# Patient Record
Sex: MALE | Race: WHITE
[De-identification: names, ages, dates, MRNs, and addresses within clinical notes are randomized per-mention and may not be internally consistent; named-entity substitution may affect disease eponyms.]

---

## 2019-07-17 ENCOUNTER — HOSPITAL ENCOUNTER (INPATIENT)
Dept: HOSPITAL 25 - ED | Age: 59
LOS: 5 days | Discharge: HOME HEALTH | DRG: 467 | End: 2019-07-22
Attending: ORTHOPAEDIC SURGERY | Admitting: ORTHOPAEDIC SURGERY
Payer: COMMERCIAL

## 2019-07-17 DIAGNOSIS — R78.81: ICD-10-CM

## 2019-07-17 DIAGNOSIS — T84.51XA: Primary | ICD-10-CM
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
## 2019-07-17 DIAGNOSIS — E78.5: ICD-10-CM

## 2019-07-17 DIAGNOSIS — F32.9: ICD-10-CM
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
## 2019-07-17 DIAGNOSIS — Z87.891: ICD-10-CM

## 2019-07-17 DIAGNOSIS — B95.4: ICD-10-CM

## 2019-07-17 DIAGNOSIS — D62: ICD-10-CM

## 2019-07-17 DIAGNOSIS — I10: ICD-10-CM

## 2019-07-17 LAB
ALBUMIN SERPL BCG-MCNC: 4.1 G/DL (ref 3.2–5.2)
ALBUMIN/GLOB SERPL: 1.6 {RATIO} (ref 1–3)
ALP SERPL-CCNC: 62 U/L (ref 34–104)
ALT SERPL W P-5'-P-CCNC: 23 U/L (ref 7–52)
ANION GAP SERPL CALC-SCNC: 8 MMOL/L (ref 2–11)
AST SERPL-CCNC: 15 U/L (ref 13–39)
BASOPHILS # BLD AUTO: 0.1 10^3/UL (ref 0–0.2)
BUN SERPL-MCNC: 12 MG/DL (ref 6–24)
BUN/CREAT SERPL: 12.5 (ref 8–20)
CALCIUM SERPL-MCNC: 9 MG/DL (ref 8.6–10.3)
CHLORIDE SERPL-SCNC: 104 MMOL/L (ref 101–111)
EOSINOPHIL # BLD AUTO: 0 10^3/UL (ref 0–0.6)
GLOBULIN SER CALC-MCNC: 2.6 G/DL (ref 2–4)
GLUCOSE SERPL-MCNC: 134 MG/DL (ref 70–100)
HCO3 SERPL-SCNC: 24 MMOL/L (ref 22–32)
HCT VFR BLD AUTO: 45 % (ref 42–52)
HGB BLD-MCNC: 15.7 G/DL (ref 14–18)
LYMPHOCYTES # BLD AUTO: 1.2 10^3/UL (ref 1–4.8)
MCH RBC QN AUTO: 31 PG (ref 27–31)
MCHC RBC AUTO-ENTMCNC: 35 G/DL (ref 31–36)
MCV RBC AUTO: 88 FL (ref 80–94)
MONOCYTES # BLD AUTO: 2 10^3/UL (ref 0–0.8)
NEUTROPHILS # BLD AUTO: 15.5 10^3/UL (ref 1.5–7.7)
NRBC # BLD AUTO: 0 10^3/UL
NRBC BLD QL AUTO: 0.1
PLATELET # BLD AUTO: 207 10^3/UL (ref 150–450)
POTASSIUM SERPL-SCNC: 3.7 MMOL/L (ref 3.5–5)
PROT SERPL-MCNC: 6.7 G/DL (ref 6.4–8.9)
RBC # BLD AUTO: 5.14 10^6 /UL (ref 4.18–5.48)
SODIUM SERPL-SCNC: 136 MMOL/L (ref 135–145)
WBC # BLD AUTO: 18.8 10^3/UL (ref 3.5–10.8)

## 2019-07-17 PROCEDURE — 87205 SMEAR GRAM STAIN: CPT

## 2019-07-17 PROCEDURE — 80202 ASSAY OF VANCOMYCIN: CPT

## 2019-07-17 PROCEDURE — 87186 SC STD MICRODIL/AGAR DIL: CPT

## 2019-07-17 PROCEDURE — 80053 COMPREHEN METABOLIC PANEL: CPT

## 2019-07-17 PROCEDURE — 86900 BLOOD TYPING SEROLOGIC ABO: CPT

## 2019-07-17 PROCEDURE — 89051 BODY FLUID CELL COUNT: CPT

## 2019-07-17 PROCEDURE — 86140 C-REACTIVE PROTEIN: CPT

## 2019-07-17 PROCEDURE — 87476 LYME DIS DNA AMP PROBE: CPT

## 2019-07-17 PROCEDURE — 93306 TTE W/DOPPLER COMPLETE: CPT

## 2019-07-17 PROCEDURE — 77002 NEEDLE LOCALIZATION BY XRAY: CPT

## 2019-07-17 PROCEDURE — 36415 COLL VENOUS BLD VENIPUNCTURE: CPT

## 2019-07-17 PROCEDURE — C1776 JOINT DEVICE (IMPLANTABLE): HCPCS

## 2019-07-17 PROCEDURE — 87040 BLOOD CULTURE FOR BACTERIA: CPT

## 2019-07-17 PROCEDURE — 77001 FLUOROGUIDE FOR VEIN DEVICE: CPT

## 2019-07-17 PROCEDURE — 88300 SURGICAL PATH GROSS: CPT

## 2019-07-17 PROCEDURE — 85025 COMPLETE CBC W/AUTO DIFF WBC: CPT

## 2019-07-17 PROCEDURE — 80048 BASIC METABOLIC PNL TOTAL CA: CPT

## 2019-07-17 PROCEDURE — 20610 DRAIN/INJ JOINT/BURSA W/O US: CPT

## 2019-07-17 PROCEDURE — 86850 RBC ANTIBODY SCREEN: CPT

## 2019-07-17 PROCEDURE — 87641 MR-STAPH DNA AMP PROBE: CPT

## 2019-07-17 PROCEDURE — 86618 LYME DISEASE ANTIBODY: CPT

## 2019-07-17 PROCEDURE — 87640 STAPH A DNA AMP PROBE: CPT

## 2019-07-17 PROCEDURE — 87070 CULTURE OTHR SPECIMN AEROBIC: CPT

## 2019-07-17 PROCEDURE — 87073 CULTURE BACTERIA ANAEROBIC: CPT

## 2019-07-17 PROCEDURE — 85652 RBC SED RATE AUTOMATED: CPT

## 2019-07-17 PROCEDURE — 87798 DETECT AGENT NOS DNA AMP: CPT

## 2019-07-17 PROCEDURE — 85018 HEMOGLOBIN: CPT

## 2019-07-17 PROCEDURE — 36569 INSJ PICC 5 YR+ W/O IMAGING: CPT

## 2019-07-17 PROCEDURE — 85049 AUTOMATED PLATELET COUNT: CPT

## 2019-07-17 PROCEDURE — 85610 PROTHROMBIN TIME: CPT

## 2019-07-17 PROCEDURE — 99284 EMERGENCY DEPT VISIT MOD MDM: CPT

## 2019-07-17 PROCEDURE — 76937 US GUIDE VASCULAR ACCESS: CPT

## 2019-07-17 PROCEDURE — 85014 HEMATOCRIT: CPT

## 2019-07-17 PROCEDURE — 88112 CYTOPATH CELL ENHANCE TECH: CPT

## 2019-07-17 PROCEDURE — 87077 CULTURE AEROBIC IDENTIFY: CPT

## 2019-07-17 PROCEDURE — 86901 BLOOD TYPING SEROLOGIC RH(D): CPT

## 2019-07-17 RX ADMIN — OXYCODONE HYDROCHLORIDE AND ACETAMINOPHEN PRN TAB: 5; 325 TABLET ORAL at 20:53

## 2019-07-17 RX ADMIN — DOCUSATE SODIUM SCH MG: 100 CAPSULE, LIQUID FILLED ORAL at 20:54

## 2019-07-17 RX ADMIN — MAGNESIUM HYDROXIDE SCH: 400 SUSPENSION ORAL at 20:54

## 2019-07-17 RX ADMIN — OXYCODONE HYDROCHLORIDE AND ACETAMINOPHEN PRN TAB: 5; 325 TABLET ORAL at 16:41

## 2019-07-17 NOTE — CONSULT
Subjective


Date of Service: 19


Interval History: 





This is a 58 year old male with hx of OA, HTN and HLP that presented to the ED 

with 2 day history of progressive hip pain, fevers, chills and diaphoresis. 

Patient has history of right total hip replacement in  with Dr. Nguyễn. He 

has had no complications since his surgery and up to this point has been in 

good health. In the ER, he is noted to have leukocytosis and fever. Joint 

aspirate of the right hip appears purulent. Plan is for the OR tomorrow with 

orthopedics for washout. Hospitalists are consulted for pre-op evaluation.





Patient denies any dyspnea with exertion, no chest pain, no previous hx of 

ischemic events or stroke, no renal disease and no hx of diabetes mellitus. He 

normally walks 6-8 miles per day at work without issue.





 


Family History: Unchanged from Admission - non-contributory


Social History: Unchanged from Admission - remote history of tobacco, denies 

ETOH, no drugs, works full time, lives with wife


Past Medical History: Unchanged from Admission - HTN, HLP, OA





Review of Systems





- Measurements


Intake and Output: 


Intake and Output Last 24 Hours











 07/15/19 07/16/19 07/17/19 07/18/19





 06:59 06:59 06:59 06:59


 


Intake Total    520


 


Balance    520


 


Weight    245 lb


 


Intake:    


 


  IV Fluids    20


 


    NS    20


 


  Medicated IV    500


 


    Vancomycin    500














- Review of Systems


General Comments: 





Reports general malaise, pain and discomfort


Constitutional Symptoms: 


   Negative: Weight Gain, Weight Loss, Weakness, Fatigue, Fever, Night Sweats, 

Unexplained Falls, Other


Dermatology: 


   Negative: Normal, Rash, Skin Lesions, Cancer, Skin Lumps, Other


HEENT: Positive: Normal


Eyes: Positive: Contacts or Glasses


Thyroid: Positive: Normal


Pulmonary: 


   Negative: Normal, Cough, Sputum, Hemoptysis, Wheezing, Respiratory Distress, 

Shortness of Breath, COPD, Asthma, Exercise Intolerance, Home Oxygen, Other


Cardiology: 


   Negative: Normal, Chest Pain, Shortness of Breath, Palpitations, Swelling of 

Ankles, Peripheral Vascular Dis, Edema, Faintness, Syncope, Claudication, 

Proximal NocturnalDyspnea, Orthopnoea, Other


Gastroenterology: 


   Negative: Normal, Abdominal Pain, Nausea, Vomiting, Anorexia, Indigestion, 

Difficulty Swallowing, Heartburn, Constipation, Diarrhea, Blood in Stools, 

Change in Bowel Habits, Haematemesis, Melena, Other


Genital - Urinary: 


   Negative: Normal, Dysuria, Hematuria, Polyuria, Nocturia, Other


Musculoskeletal: Positive: Joint Pain


Endocrinology: 


   Negative: Normal, Thyroid Problems, Adrenal Problems, Gonadal Problems, 

Family Hx Endocrine Disorders, Obesity, Diabetes Mellitus, Hyperglycemia, Hx 

Hypoglycemia, Diabetic Foot Ulcers, Calluses, Hirsutism, Menstrual Abnormalities

, Polydipsia, Polyuria, Gonadal Problems, Gynecomastia, Pituitary disease, Other


Neurology: Positive: Normal


Psychiatry: Positive: Normal


Allergic/Immunologic: 


   Negative: Hx Anaphylaxis, Hx Angioedema, Hx Environmental, Hx Seasonal, 

Asthma, Hx HIV, Immunocompromise, Swollen Glands LymphNodes, Other





Objective


Active Medications: 








Acetaminophen (Tylenol Tab*)  975 mg PO Q8H PRN


   PRN Reason: FEVER/PAIN


Atenolol (Tenormin Tab*)  25 mg PO DAILY [facility]


Cyclobenzaprine HCl (Flexeril Tab*)  10 mg PO TID PRN


   PRN Reason: SPASMS


Diphenhydramine HCl (Benadryl Iv*)  25 mg IV Q6H PRN


   PRN Reason: itching


Diphenhydramine HCl (Benadryl Po*)  25 mg PO Q6H PRN


   PRN Reason: itching


Docusate Sodium (Colace Cap*)  100 mg PO BID [facility]


Heparin Sodium (Porcine) (Heparin Vial(*))  5,000 units SUBCUT Q8HR [facility]


   Stop: 19 23:59


Vancomycin HCl 1,250 mg/ (Sodium Chloride)  250 mls @ 166.667 mls/hr IVPB Q8H 

[facility]


Lactated Ringer's (Lactated Ringers 1000 Ml Bag*)  1,000 mls @ 100 mls/hr IV 

PER RATE [facility]


Lactulose (Lactulose*)  30 ml PO BID PRN


   PRN Reason: CONSTIPATION


Magnesium Hydroxide (Milk Of Magnesia Liq*)  30 ml PO BID [facility]


Magnesium Hydroxide (Milk Of Magnesia Liq*)  30 ml PO Q6H PRN


   PRN Reason: constipation


Morphine Sulfate (Morphine Inj (Syringe))*)  2 mg IV Q4H PRN


   PRN Reason: PAIN - BREAKTHROUGH


Ondansetron HCl (Zofran Inj*)  4 mg IV Q6H PRN


   PRN Reason: nausea


Ondansetron HCl (Zofran Odt Tab*)  4 mg PO Q6H PRN


   PRN Reason: NAUSEA


Oxycodone HCl (Roxycodone Tab*)  10 mg PO Q4H PRN


   PRN Reason: PAIN - SEVERE


Oxycodone/Acetaminophen (Percocet 5/325 Tab*)  1 tab PO Q4H PRN


   PRN Reason: PAIN - MILD


Oxycodone/Acetaminophen (Percocet 5/325 Tab*)  2 tab PO Q4H PRN


   PRN Reason: PAIN - MODERATE


   Last Admin: 19 16:41 Dose:  2 tab


Paroxetine HCl (Paxil Tab*)  10 mg PO DAILY [facility]


Pharmacy Consult (Vancomycin Per Pharmacy*)  1 note FOLLOW UP .VANC PER 

PHARMACY AIME; Protocol


Pharmacy Profile Note (Vancomycin Trough Check)  1 note FOLLOW UP 1500 ONE


   Stop: 19 15:01








 Vital Signs - 8 hr











  19





  11:29 11:59 12:01


 


Temperature   


 


Pulse Rate 79 75 75


 


Respiratory   





Rate   


 


Blood Pressure 113/67 122/65 





(mmHg)   


 


O2 Sat by Pulse 93 94 94





Oximetry   














  19





  13:00 13:05 14:42


 


Temperature   


 


Pulse Rate 77 81 77


 


Respiratory   





Rate   


 


Blood Pressure  120/74 





(mmHg)   


 


O2 Sat by Pulse 94 94 94





Oximetry   














  19





  14:44 14:53 15:00


 


Temperature  100.0 F 


 


Pulse Rate 82 75 77


 


Respiratory  16 





Rate   


 


Blood Pressure 138/77 138/77 





(mmHg)   


 


O2 Sat by Pulse 95 96 95





Oximetry   














  19





  15:05 16:00 16:05


 


Temperature   


 


Pulse Rate 84 84 83


 


Respiratory   





Rate   


 


Blood Pressure 126/68  120/67





(mmHg)   


 


O2 Sat by Pulse 93 92 92





Oximetry   














  19





  16:26 16:40 16:41


 


Temperature 99.4 F  


 


Pulse Rate 80  


 


Respiratory 18 20 18





Rate   


 


Blood Pressure 123/62  





(mmHg)   


 


O2 Sat by Pulse 95  





Oximetry   














  19





  17:14


 


Temperature 99.4 F


 


Pulse Rate 80


 


Respiratory 18





Rate 


 


Blood Pressure 123/62





(mmHg) 


 


O2 Sat by Pulse 95





Oximetry 











Oxygen Devices in Use Now: None


Appearance: alert, mildly diaphoretic, NAD


Eyes: No Scleral Icterus, PERRLA


Ears/Nose/Mouth/Throat: NL Teeth, Lips, Gums, Clear Oropharnyx, Mucous 

Membranes Moist


Neck: NL Appearance and Movements; NL JVP, Trachea Midline


Respiratory: Symmetrical Chest Expansion and Respiratory Effort, Clear to 

Auscultation


Cardiovascular: NL Sounds; No Murmurs; No JVD, RRR, No Edema


Abdominal: NL Sounds; No Tenderness; No Distention, No Hepatosplenomegaly


Lymphatic: No Cervical Adenopathy


Extremities: No Edema, No Clubbing, Cyanosis


Skin: No Rash or Ulcers, - - mild erythema over right hip


Neurological: Alert and Oriented x 3, NL Sensation, NL Muscle Strength and Tone


Nutrition: Taking PO's


Result Diagrams: 


 19 10:05





 19 10:05


Microbiology and Other Data: 


 Microbiology











 19 14:14 Gram Stain - Final





 Body Fluid Skin and Soft Tissue MRSA/MSSA (PCR - Final





    Mrsa Negative





    S.aureus Negative











Diagnostic Imaging: 





Patient Name:         ANGELITO DE LA O                                           

                        Medical Record#: [id number]


Ordering Physician: Leo Alonzo MD                                          

                        Acct.#: [id number]


:     1960         Age: 58   Sex: M                                   

                        Location: EMERGENCY DEPARTMENT


Exam Date: 19 1209                                                       

                        ADM Status: REG ER


Order Information:                         Aspiration/Injection Hip Right


Accession Number:                          [id number]


CPT:                                       [id number]


Indication: Right hip pain, fever and elevated white blood cell.





Approximately 27 seconds of fluoroscopy time was used.





Using usual aseptic technique and lidocaine as local anesthetic a 22-gauge 

needle was


placed within the right hip joint. Aspiration was attempted. 2 mL of saline was 

then


injected into the joint space. The needle was then manipulated. Approximately 

15 mL of


pink murky fluid was then aspirated. The specimen was sent for bacteriology.





IMPRESSION: Successful aspiration and localization of the right hip with 

aspiration of 15


mL of murky pink fluid from the right hip joint.





Patient Name:         ANGELITO DE LA O                                           

                        Medical Record#: [id number]


Ordering Physician: Elisa HEATH                                        

                        Acct.#: [id number]


:     1960         Age: 58   Sex: M                                   

                        Location: EMERGENCY DEPARTMENT


Exam Date: 19 0808                                                       

                        ADM Status: REG ER


Order Information:                         HIP RIGHT 2 VIEWS AND PELVIS


Accession Number:                          [id number]


CPT:                                       [id number]


Indication: RIGHT hip pain. Post hip replacement in .





Comparison: 2012





Technique: AP pelvis and AP and frog-leg lateral views RIGHT hip.





Report: Normally located RIGHT total hip replacement. Negative for 

periprosthetic fracture


or stigmata of loosening. Negative for pelvic fracture or joint diastases. 

Lumbar sacral


spine degenerative spondylosis and facet joint osteoarthritis. Unremarkable 

soft tissue


contours. 





IMPRESSION: 


#. Unremarkable radiographic follow-up of prosthetic RIGHT hip.




















Assessment/Plan - Billing


Assessment and Recommendations: 





This is a 58 year old male that presents to the ER with septic right hip 

prosthesis:





1.  Septic Right Hip Prosthesis


 - POC as per orthopedics


 - OR for washout in AM


 - NPO after midnight


 - Pain control PRN


 - ID consulted, continue vancomycin and follow cultures


 - Tylenol for fevers


 - Does not appear to be meeting sepsis criteria, has leukocytosis but no 

hypotension or tachycardia





2.  Hx of HTN


 - Continue atenolol





3. HLP


 - Continue simvastatin





VTE PPX: 


 - Heparin subq








Diet:


 - NPO after midnight








Code Status:


 - Full code








Admission Status and Rationale:


 


 - RCRI Score is 0 Points, Class I Risk or 3.9% 30 day Risk for Post Operative 

Cardiac Event


 - Medically optimized for procedure in AM


 - Inpatient, dispo per ortho.

## 2019-07-17 NOTE — CONSULT
Consult


Consult: 





S:





See consult note by IVANA Yao from Ortho Service.





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 Nguyễn, without 

any problems whatsoever with R hip until yesterday, 7/16/19.





R hip pain started at work yesterday, worsened so that he could barely walk.  

Overnight fevers, sweats, chills, came to ED this morning.  ED history and exam 

concerning for infection, labs obtained, ortho called and patient admitted to 

my service.





No recent risk factors identified for a bacterial joint infection.  Colonoscopy 

multiple months ago.  Recent embedded tick bite 2-3 weeks ago on face.





O:





Diaphoretic.


RLE:


-  increased warmth skin of hip and thigh


-  TTP about hip


-  pain beyond passive hip flexion of 40 degrees, pain with passive hip rotation


-  NVID  


Microbiology





07/17/19 14:14   Body Fluid   Gram Stain - Final





 Selected Entries











  07/17/19 07/17/19





  16:26 17:14


 


Temperature 99.4 F 99.4 F








 Laboratory Tests











  07/17/19 07/17/19 07/17/19





  10:05 10:05 14:14


 


WBC  18.8 H  


 


Neut % (Auto)  82.7  


 


C-Reactive Protein   150.34 H 


 


Fluid WBC    00967











A:





Infected R total hip arthroplasty








P:





-  See note from IVANA Yao 


-  Given the level of pain, the significant elevation in WBC, the gram stain, a 

bacterial infection is significantly more likely than a Lyme infection


-  The patient benefits prognosis-wise from an early onset with just 1 day of 

symptoms at this point


-  ID and Hospitalist consults pending


-  The patient will go to the OR tomorrow or Friday for open I&D with exchange 

of polyethylene liner by myself or one of my partners


-  Await blood culture, hip aspiration culture results to refine antibiotic 

coverage.  Patient currently on Vanco.


-  NPO p midnight, hold anticoagulation post midnight

## 2019-07-17 NOTE — ED
Lower Extremity





- HPI Summary


HPI Summary: 


This patient is a 58 year [facility] with a 

chief complaint of progressively worsening right hip pain since 7/16/19. Pt 

reports he cannot ambulate, and has no thigh or lateral strength. Pt previously 

has had a hip replacement, but has not had complications before this. Per triage

, the patient rates the pain 10/10 in severity. 








- History of Current Complaint


Chief Complaint: EDHipPelvisInjury


Stated Complaint: THINKS HIP REPLACEMENT BROKE PER PT


Time Seen by Provider: 07/17/19 08:59


Hx Obtained From: Patient


Onset of Pain: Hours


Onset/Duration: Still Present


Severity Initially: Moderate


Severity Currently: Severe


Pain Intensity: 10


Pain Scale Used: 0-10 Numeric


Timing: Constant


Location: Is Diffuse - right hip


Aggravating Factor(s): Standing, Ambulation, Movement


Able to Bear Weight: No





- Allergies/Home Medications


Allergies/Adverse Reactions: 


 Allergies











Allergy/AdvReac Type Severity Reaction Status Date / Time


 


No Known Allergies Allergy   Verified 07/17/19 07:59











Home Medications: 


 Home Medications





Atenolol TAB* [Tenormin TAB* 25 MG] 25 mg PO DAILY 07/17/19 [History Confirmed 

07/17/19]


PARoxetine HCL TAB* [Paxil TAB*] 10 mg PO DAILY 07/17/19 [History Confirmed 07/ 17/19]











PMH/Surg Hx/FS Hx/Imm Hx


Sensory History: 


   Denies: Hx Legally Blind


EENT History: 


   Denies: Hx Deafness





- Surgical History


Surgical History: Yes


Surgery Procedure, Year, and Place: Right Hip Replacement


Infectious Disease History: No


Infectious Disease History: 


   Denies: Traveled Outside the [place] in Last 30 Days





- Family History


Known Family History: 


   Negative: Hypertension, Diabetes





- Social History


Occupation: Retired


Lives: With Family


Alcohol Use: None


Hx Substance Use: No


Substance Use Type: Reports: None


Hx Tobacco Use: No


Smoking Status (MU): Never Smoked Tobacco





Review of Systems


Negative: Fever


Positive: Other - right hip pain


All Other Systems Reviewed And Are Negative: Yes





Physical Exam





- Summary


Physical Exam Summary: 


Appearance: Well-appearing, Well-nourished, lying in bed comfortably


Skin: Warm, dry, no obvious rash


Eyes: sclera anicteric, no conjunctival pallor


ENT: mucous membranes moist, pharynx appears normal


Neck: Supple, nontender


Respiratory: Clear to auscultation, no signs of respiratory distress


Cardiovascular: Normal S1, S2. No murmurs. Normal distal pulses in tibial and 

radial bilaterally.


Abdomen: Soft, nontender, normal active bowel sounds present


Musculoskeletal: Normal, Strength/ROM Intact, Tenderness over greater trochanter


Neurological: A&Ox3, awake and alert, mentation is normal, speech is fluent and 

appropriate


Psychiatric: affect is normal, does not appear anxious or depressed








Triage Information Reviewed: Yes


Vital Signs On Initial Exam: 


 Initial Vitals











Temp Pulse Resp BP Pulse Ox


 


 98.6 F   86   16   114/70   96 


 


 07/17/19 07:57  07/17/19 07:57  07/17/19 07:57  07/17/19 07:57  07/17/19 07:57











Vital Signs Reviewed: Yes





Diagnostics





- Vital Signs


 Vital Signs











  Temp Pulse Resp BP Pulse Ox


 


 07/17/19 07:57  98.6 F  86  16  114/70  96














- Laboratory


Result Diagrams: 


 07/17/19 10:05





 07/17/19 10:05


Lab Statement: Any lab studies that have been ordered have been reviewed, and 

results considered in the medical decision making process.





- Radiology


  ** Hip/Pelvis X-ray


Radiology Interpretation Completed By: Radiologist


Summary of Radiographic Findings: Hip/Pelvis X-Ray reveals, per radiologist, 

IMPRESSION:  #. Unremarkable radiographic follow-up of prosthetic RIGHT hip.  

ED physician has reviewed this radiology report.





- Additional Comments


Diagnostic Additional Comments: 


Soft Tissue US reveals, per radiologist,


 IMPRESSION: No definite fluid collection is noted in the ultrasound of the 

right hip.


Aspiration will be attempted under fluoroscopy.  ED physician has reviewed this 

radiology report











Re-Evaluation





- Re-Evaluation


  ** First Eval


Re-Evaluation Time: 09:40


Comment: Discussed results with pt.





  ** Second Eval


Re-Evaluation Time: 11:01


Comment: Discussed plan of care with pt.





Lower Extremity Course/Dx





- Course


Course Of Treatment: This patient is a 58 year old M presenting to [facility] 

accompanied by wife with a chief complaint of progressively worsening right hip 

pain since 7/16/19. Pt reports he cannot ambulate, and has no thigh or lateral 

strength. Pt previously has had a hip replacement, but has not had 

complications before this. Per triage, the patient rates the pain 10/10 in 

severity.  Blood work obtained. WBC is 18.8, Glucose is 134, C-Reactive Protein 

is 150.34.  Hip/Pelvis X-Ray reveals, per radiologist, IMPRESSION:  #. 

Unremarkable radiographic follow-up of prosthetic RIGHT hip.  Soft Tissue US 

reveals, per radiologist,.  IMPRESSION: No definite fluid collection is noted 

in the ultrasound of the right hip.  Aspiration will be attempted under 

fluoroscopy.  We discussed patient care with Dr. Mckeon and they recommended 

pt be admitted. Dr. Wylie accepts pt for admission. Patient will be admitted. 

The patient is agreeable with this plan.





- Diagnoses


Provider Diagnoses: 


 Septic hip








- Physician Notifications


Discussed Care Of Patient With: Gary Mckeon


Time Discussed With Above Provider: 09:39


Instructed by Provider To: Other - Disccused pt case with Dr. Mckeon; 1214 - 

discussed pt case with Dr. Wylie and she acceps pt for admission.





Discharge





- Sign-Out/Discharge


Documenting (check all that apply): Patient Departure - Admit


All imaging exams completed and their final reports reviewed: Yes


Patient Received Moderate/Deep Sedation with Procedure: No





- Discharge Plan


Condition: Good


Disposition: ADMITTED TO [facility]





- Attestation Statements


Document Initiated by Scribe: Yes


Documenting Scribe: Lala Valverde


Provider For Whom Scribe is Documenting (Include Credential): Dr. Leo Alonzo MD


Scribe Attestation: 


ILala, scribed for Dr. Leo Alonzo MD on 07/17/19 at 1756. 


Status of Scribe Document: Ready

## 2019-07-17 NOTE — HP
ADMISSION HISTORY AND PHYSICAL:

 

DATE OF ADMISSION:  07/17/19

 

ATTENDING ORTHOPEDIC PROVIDER:  Gary Mckeon MD.* (DICTATED BY IVANA ELAINE)

 

CHIEF COMPLAINT:  Right hip pain.

 

HISTORY OF PRESENT ILLNESS:  The patient is a 58-year-old male who presented to 
[facility] today on 07/17/19 with a chief complaint of right hip 
pain x2 days with associated fever and chills.  Onset of pain was not 
associated with any trauma or overuse.  The patient does not have any recent 
history of illness or any recent history of dental work.  The patient is 
typically able to walk long distances without an assisted device and without 
any hip pain, though as of 2 days ago, he is completely unable to bear weight.  
He does have a history of right total hip replacement by Dr. Nguyễn in 2012.  He 
has had no problems with this hip since it was placed.  Past medical history 
includes hypertension and hyperlipidemia.    Pain is localized to the right hip
, it is severe  and It does not radiate.  The patient denies any recent illness 
including URI, chest congestion, cough, abdominal pain, nausea, dysuria, or 
skin lesions.  The patient had an embedded tick in the left side of his face 1 
week ago and has had roughly 50 tick bites in the past. He has no history of 
heart attack, stroke, DVT, PE, blood transfusion, HIV, or hepatitis.  He has no 
history of adverse effects with anesthesia.

 

PAST MEDICAL HISTORY:

1.  Hypertension.

2.  Hyperlipidemia.

 

PAST SURGICAL HISTORY:  Hernia repair and right total hip arthroplasty.  No 
history of adverse effects with anesthesia.

 

MEDICATIONS:  Home medications include simvastatin 40 mg and a blood pressure 
medication, the patient does not recall.

 

ALLERGIES:  No known drug allergies.

 

FAMILY HISTORY:  No adverse effects with anesthesia.

 

SOCIAL HISTORY:  He does not drink, smoke, or use illicit drugs.  Works at a 
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

 

REVIEW OF SYSTEMS:  General:  Positive for fever and chills.  Negative for 
recent illness.  HEENT:  Negative for any headache, head trauma.  Cardio:  No 
irregular heart beats, chest pain, or history of MI.  Respiratory:  No 
shortness of breath or cough.  No history of asthma.  Abdomen:  No abdominal 
pain, nausea, vomiting, or diarrhea.  :  No dysuria.  Musculoskeletal:  
Positive for severe right hip pain. Neuro:  Sensation intact to all extremities 
without any reported numbness or paresthesias.  Hematology:  No history of 
blood clot.  Skin:  No rash or skin lesions.

 

                               PHYSICAL EXAMINATION

 

GENERAL:  No acute distress though appears uncomfortable. +oriented to person, 
place, and time.  The patient appears flushed.

 

VITAL SIGNS:  Temperature 100.0, pulse rate 75, respiratory rate 16, oxygen 
saturation 96%, blood pressure 138/77.

 

HEENT:  Normocephalic, atraumatic.  Extraocular movements are intact.

 

RESPIRATORY:  Clear to auscultation bilaterally.

 

CARDIO:  S1, S2.

 

ABDOMEN:  Bowel sounds are normoactive.  Nontender to palpation.  No guarding 
or rigidity.

 

MUSCULOSKELETAL:  Upper extremities and left lower extremity, skin envelope 
intact. No obvious deformity.  Nontender to palpation.  Active flexion and 
extension in all major joints without pain.  Right lower extremity, skin 
envelope intact.  No obvious bony deformity.  The patient is exquisitely tender 
over the hip.  There is no erythema, warmth, or obvious fluctuance in this 
area.  He is unable to actively flex at the hip due to pain.  Passive range of 
motion 0 to 45 is relatively nonpainful, though he does have some moderate pain 
starting at 45 degrees of flexion that becomes severe by 80 degrees of flexion.
  Negative logroll at the hip.

 

NEURO:  Sensation intact to light touch throughout bilateral upper and lower 
extremities.

 

VASCULAR:  DP pulse 2+ bilaterally.

 

PSYCH:  Appropriate affect.

 

SKIN:  No rash, lesions.  No open fractures.

 

 DIAGNOSTIC STUDIES:  Hip x-ray, unremarkable.  Right hip and pelvis x-ray, no 
sign of fracture dislocation.  Right hip aspiration, successful for aspiration 
of 15 cc of murky pink fluid from the right hip.

 

ASSESSMENT:    Septic right hip, also need to rule out lyme 

 

PLAN:  The patient agrees to I and D of right hip, which is planned for 
tomorrow.  I have consulted Infectious Disease for antibiotic management as 
well as the hospitalist service for medical comanagement and preop 
optimization.  Blood culture, CBC, BMP, type and screen, INR, lyme screen 
ordered.  I have also added on lyme studies to the fluid culture.  The patient 
will be on subcu heparin for DVT prophylaxis, which needs to be held at 
midnight and he needs to n.p.o. from midnight for OR tomorrow.  I will start 
him on vancomycin.  Antibiotics to be narrowed per cultures. The patient will 
be seen by Dr. Mike aragon. I have relayed results of aspiration and my 
exam to Dr Mckeon who agrees with the plan. 

 

 ____________________________________ IVANA ELAINE

 

[id number]/CPS #: [id number]

[facility]

## 2019-07-18 LAB
ANION GAP SERPL CALC-SCNC: 7 MMOL/L (ref 2–11)
BASOPHILS # BLD AUTO: 0 10^3/UL (ref 0–0.2)
BUN SERPL-MCNC: 17 MG/DL (ref 6–24)
BUN/CREAT SERPL: 18.9 (ref 8–20)
CALCIUM SERPL-MCNC: 8.3 MG/DL (ref 8.6–10.3)
CHLORIDE SERPL-SCNC: 105 MMOL/L (ref 101–111)
EOSINOPHIL # BLD AUTO: 0.1 10^3/UL (ref 0–0.6)
GLUCOSE SERPL-MCNC: 123 MG/DL (ref 70–100)
HCO3 SERPL-SCNC: 24 MMOL/L (ref 22–32)
HCT VFR BLD AUTO: 40 % (ref 42–52)
HGB BLD-MCNC: 13.7 G/DL (ref 14–18)
INR PPP/BLD: 1.02 (ref 0.82–1.09)
LYMPHOCYTES # BLD AUTO: 1.2 10^3/UL (ref 1–4.8)
MCH RBC QN AUTO: 30 PG (ref 27–31)
MCHC RBC AUTO-ENTMCNC: 34 G/DL (ref 31–36)
MCV RBC AUTO: 89 FL (ref 80–94)
MONOCYTES # BLD AUTO: 1.2 10^3/UL (ref 0–0.8)
NEUTROPHILS # BLD AUTO: 8.5 10^3/UL (ref 1.5–7.7)
NRBC # BLD AUTO: 0 10^3/UL
NRBC BLD QL AUTO: 0.1
PLATELET # BLD AUTO: 170 10^3/UL (ref 150–450)
POTASSIUM SERPL-SCNC: 3.8 MMOL/L (ref 3.5–5)
RBC # BLD AUTO: 4.51 10^6 /UL (ref 4.18–5.48)
SODIUM SERPL-SCNC: 136 MMOL/L (ref 135–145)
WBC # BLD AUTO: 11.1 10^3/UL (ref 3.5–10.8)

## 2019-07-18 PROCEDURE — 0SRR01Z REPLACEMENT OF RIGHT HIP JOINT, FEMORAL SURFACE WITH METAL SYNTHETIC SUBSTITUTE, OPEN APPROACH: ICD-10-PCS | Performed by: ORTHOPAEDIC SURGERY

## 2019-07-18 PROCEDURE — 0SUA09Z SUPPLEMENT RIGHT HIP JOINT, ACETABULAR SURFACE WITH LINER, OPEN APPROACH: ICD-10-PCS | Performed by: ORTHOPAEDIC SURGERY

## 2019-07-18 PROCEDURE — 0SPR0JZ REMOVAL OF SYNTHETIC SUBSTITUTE FROM RIGHT HIP JOINT, FEMORAL SURFACE, OPEN APPROACH: ICD-10-PCS | Performed by: ORTHOPAEDIC SURGERY

## 2019-07-18 PROCEDURE — 0SP909Z REMOVAL OF LINER FROM RIGHT HIP JOINT, OPEN APPROACH: ICD-10-PCS | Performed by: ORTHOPAEDIC SURGERY

## 2019-07-18 RX ADMIN — VANCOMYCIN HYDROCHLORIDE SCH MLS/HR: 1 INJECTION, POWDER, LYOPHILIZED, FOR SOLUTION INTRAVENOUS at 00:42

## 2019-07-18 RX ADMIN — DOCUSATE SODIUM SCH: 100 CAPSULE, LIQUID FILLED ORAL at 22:35

## 2019-07-18 RX ADMIN — VANCOMYCIN HYDROCHLORIDE SCH MLS/HR: 1 INJECTION, POWDER, LYOPHILIZED, FOR SOLUTION INTRAVENOUS at 07:23

## 2019-07-18 RX ADMIN — DOCUSATE SODIUM SCH: 100 CAPSULE, LIQUID FILLED ORAL at 09:52

## 2019-07-18 RX ADMIN — MAGNESIUM HYDROXIDE SCH: 400 SUSPENSION ORAL at 22:16

## 2019-07-18 RX ADMIN — DOCUSATE SODIUM SCH: 100 CAPSULE, LIQUID FILLED ORAL at 22:16

## 2019-07-18 RX ADMIN — SODIUM CHLORIDE, SODIUM LACTATE, POTASSIUM CHLORIDE, AND CALCIUM CHLORIDE SCH MLS/HR: 600; 310; 30; 20 INJECTION, SOLUTION INTRAVENOUS at 22:13

## 2019-07-18 RX ADMIN — PAROXETINE SCH MG: 10 TABLET, FILM COATED ORAL at 08:34

## 2019-07-18 RX ADMIN — ATENOLOL SCH MG: 25 TABLET ORAL at 08:34

## 2019-07-18 RX ADMIN — PIPERACILLIN AND TAZOBACTAM SCH MLS/HR: 3; .375 INJECTION, POWDER, LYOPHILIZED, FOR SOLUTION INTRAVENOUS; PARENTERAL at 12:11

## 2019-07-18 RX ADMIN — CEFAZOLIN SCH MLS/HR: 1 INJECTION, POWDER, FOR SOLUTION INTRAVENOUS at 22:23

## 2019-07-18 RX ADMIN — CEFTRIAXONE SODIUM SCH MLS/HR: 2 INJECTION, POWDER, FOR SOLUTION INTRAVENOUS at 14:55

## 2019-07-18 RX ADMIN — ACETAMINOPHEN SCH MG: 325 TABLET ORAL at 21:27

## 2019-07-18 RX ADMIN — MAGNESIUM HYDROXIDE SCH: 400 SUSPENSION ORAL at 09:52

## 2019-07-18 RX ADMIN — PIPERACILLIN AND TAZOBACTAM SCH: 3; .375 INJECTION, POWDER, LYOPHILIZED, FOR SOLUTION INTRAVENOUS; PARENTERAL at 09:49

## 2019-07-18 RX ADMIN — MAGNESIUM HYDROXIDE SCH: 400 SUSPENSION ORAL at 22:35

## 2019-07-18 RX ADMIN — PIPERACILLIN AND TAZOBACTAM SCH MLS/HR: 3; .375 INJECTION, POWDER, LYOPHILIZED, FOR SOLUTION INTRAVENOUS; PARENTERAL at 03:00

## 2019-07-18 NOTE — ECHO
*[facility]*

[place] [address]

Phone: [phone]

Fax #: [phone]



-------------------------------------------------------------------

Transthoracic Echocardiogram



Patient: Aditya Murray                    MRN:        [id number]

:     1960                         Study Date: 2019

Age:     58                                 Accession#: [id number]

Gender:  M                                  HR:         60 bpm

Height:  72 in /182.9 cm                    BSA:        2.32 m^2

Weight:  244.5 lb /111.1 kg                 BMI:        33.2 kg/m^2



*Sonographer: * Lois Pinedo RD

 

*Referring Physician: * Lois Cassidy

*Reading Physician: * Maghaydah, Qutaybeh MD



-------------------------------------------------------------------

Indications:   Bacteremia.



-------------------------------------------------------------------

History:   Risk factors:  Hypertension. Obese.  Hyperlipidemia.



Labs, prior tests, procedures, and surgery:

Right hip joint prosthesis ().



-------------------------------------------------------------------

Conclusions



Summary:



1. Left ventricle: The cavity size is normal. Wall thickness is

   normal. Systolic function is at the lower limits of normal. The

   estimated ejection fraction is 50-55%.

2. Right ventricle: The cavity size is mildly dilated.

3. Left atrium: The atrium is mildly dilated.

4. Right atrium: The atrium is mildly dilated.

5. Mitral valve: There is trace regurgitation.

6. No previous echocardiogram available.



-------------------------------------------------------------------

Study data:  Transthoracic echocardiogram.  Procedure:

Transthoracic echocardiography was performed. Image quality was

fair.  Complete 2D, spectral Doppler, and color flow Doppler.

Location:  Bedside.  Patient status:  Inpatient. Patient room

number: 331.  Rhythm:  Normal sinus rhythm.



-------------------------------------------------------------------

Findings



Left ventricle:  The cavity size is normal. Wall thickness is

normal. Systolic function is at the lower limits of normal. The

estimated ejection fraction is 50-55%. Wall motion is normal; there

are no regional wall motion abnormalities. Left ventricular

diastolic function parameters are normal.

Right ventricle:  The cavity size is mildly dilated. Systolic

function is low normal. Right ventricular systolic pressure may be

underestimated.

Ventricular septum:   There is septal flattening of the

interventricular septum consistent with RV volume or pressure

overload.

Left atrium:  The atrium is mildly dilated.

Right atrium:  The atrium is mildly dilated.

Mitral valve:  The leaflets are mildly thickened.  There is no

evidence of a vegetation.  There is no evidence of stenosis.

There is trace regurgitation.

Aortic valve:   The valve is trileaflet. The leaflets are mildly

thickened.  There is no evidence of a vegetation.  There is no

evidence of stenosis.   There is no significant regurgitation.

Tricuspid valve:  The leaflets are normal thickness.  There is no

evidence of a vegetation.  There is no evidence of stenosis.

There is mild regurgitation.

Pulmonic valve:   Not well visualized.  There is no evidence of

stenosis.   There is trace regurgitation.

Aorta:  Aortic root: The aortic root is appears normal.

Ascending aorta: The ascending aorta is appears normal.

Aortic arch: The aortic arch is appears normal.

Pericardium:  A prominent pericardial fat pad is present. There is

no significant pericardial effusion.

Pulmonary arteries:

Poorly visualized. Pulmonary artery pressure may be underestimated.

Systemic veins:

Inferior vena cava: The vessel is dilated. The respirophasic

diameter changes are in the normal range (&gt;= 50%).



-------------------------------------------------------------------

Measurements



 Left ventricle            Value        Ref         Aortic valve              Value       Ref

 WILLIAN, LAX                  5.0   cm     4.2 - 5.8   Fanny diam, ED              2.2   cm    -----

 ESD, LAX                  3.6   cm     2.5 - 4.0   Peak v, S                 1.38  m/sec -----

 FS, LAX                   29    %      25 - 43     VTI, S                    28.6  cm    -----

 PW, ED, LAX               1.0   cm     0.6 - 1.0   Mean grad, S              4.0   mm Hg -----

 FS                        29    %      25 - 43     Peak grad, S              8.0   mm Hg -----

 PW, ED                    1.0   cm     0.6 - 1.0   LVOT/AV, VTI ratio        0.66        -----

 E&apos;, lat fanny, TDI  (L)     9.2   cm/sec &gt;=10.0

 E/e&apos;, lat fanny,            10           ----------  Mitral valve              Value       Ref

 TDI                                                Peak E                    0.91  m/sec -----

 E&apos;, med fanny, TDI          8.1   cm/sec &gt;=7.0       Peak A                    0.66  m/sec ---
--

 E/e&apos;, med fanny,            11           ----------  Decel time                261   ms    -----

 TDI                                                Peak grad, D              3.3   mm Hg -----

 E&apos;, avg, TDI              8.7   cm/sec ----------  Peak E/A ratio            1.4         -----

 E/e&apos;, avg, TDI            10           &lt;=14

                                                    Pulmonic valve            Value       Ref

 LVOT                      Value        Ref         Peak v, S                 0.97  m/sec -----

 Peak bryant, S               1.05  m/sec  ----------  Peak grad, S              4.0   mm Hg -----

 VTI, S                    19.0  cm     ----------

 Mean grad, S              2     mm Hg  ----------  Tricuspid valve           Value       Ref

                                                    TR peak v                 2.4   m/sec &lt;=2.8

 Ventricular septum        Value        Ref         Peak RV-RA grad, S        23    mm Hg -----

 IVS, ED                   1.0   cm     0.6 - 1.0

                                                    Aortic root               Value       Ref

 Right ventricle           Value        Ref         Root diam                 3.3   cm    &lt;4.4

 WILLIAN, LAX                  4.0   cm     ----------

 WILLIAN minor ax, A4C (H)     4.8   cm     1.9 - 3.5   Ascending aorta           Value       Ref

 mid                                                AAo AP diam, S            2.9   cm    -----

 Pressure, S               31    mm Hg  ----------

                                                    Aortic arch               Value       Ref

 Left atrium               Value        Ref         Arch diam                 2.5   cm    -----

 AP dim, ES                4.00  cm     3.00 -

                                        4.00        Decending aorta           Value       Ref

 ML dim, A4C               4.2   cm     ----------  Steph peak bryant              1.07  m/sec -----

 SI dim, A4C               5.5   cm     ----------

 Vol/bsa, ES, 1-p          25    ml/m^2 12 - 37     Pulmonary artery          Value       Ref

 A4C                                                Pressure, S               27.0  mm Hg -----

 Vol/bsa, ES, A/L          34    ml/m^2 16 - 34

                                                    Inferior vena cava        Value       Ref

 Right atrium              Value        Ref         Diam                      2.2   cm    -----

 SI dim, ES                5.3   cm     3.4 - 5.3

 ML dim, ES, A4C   (H)     4.7   cm     2.6 - 4.4

 SI dim, ES, A4C           5.3   cm     3.4 - 5.3

 Estimated RAP             8     mm Hg  ----------

 

Legend:

(L)  and  (H)  meghan values outside specified reference range.



Prepared and electronically signed by



Maghaydah, Qutaybeh MD

2019 16:41

## 2019-07-18 NOTE — PN
Subjective


Date of Service: 19


Interval History: 





Patient seen and examined. Febrile today, pending OR this afternoon for 

washout. Discussed results from micro and strep bacteremia. Patient denies SOB, 

no chest pain, no SOB, no n/v. Still with hip pain on affected side and 

difficulty with ambulation. Wife at bedside.


Family History: Unchanged from Admission - non-contributory


Social History: Unchanged from Admission - remote history of tobacco, denies 

ETOH, no drugs, works full time, lives with wife


Past Medical History: Unchanged from Admission - HTN, HLP, OA





Objective


Active Medications: 








Acetaminophen (Tylenol Tab*)  975 mg PO Q8H PRN


   PRN Reason: FEVER/PAIN


   Last Admin: 19 10:28 Dose:  975 mg


Atenolol (Tenormin Tab*)  25 mg PO DAILY [facility]


   Last Admin: 19 08:34 Dose:  25 mg


Cyclobenzaprine HCl (Flexeril Tab*)  10 mg PO TID PRN


   PRN Reason: SPASMS


Diphenhydramine HCl (Benadryl Iv*)  25 mg IV Q6H PRN


   PRN Reason: itching


Diphenhydramine HCl (Benadryl Po*)  25 mg PO Q6H PRN


   PRN Reason: itching


Docusate Sodium (Colace Cap*)  100 mg PO BID [facility]


   Last Admin: 19 09:52 Dose:  Not Given


Lactated Ringer's (Lactated Ringers 1000 Ml Bag*)  1,000 mls @ 100 mls/hr IV 

PER RATE [facility]


   Last Admin: 19 05:38 Dose:  100 mls/hr


Ceftriaxone Sodium 2 gm/ (Sodium Chloride)  100 mls @ 200 mls/hr IVPB Q24H [facility]


   Last Admin: 19 14:55 Dose:  200 mls/hr


Lactated Ringer's (Lactated Ringers 1000 Ml Bag*)  1,000 mls @ 125 mls/hr IV 

PER RATE [facility]


Lactulose (Lactulose*)  30 ml PO BID PRN


   PRN Reason: CONSTIPATION


Magnesium Hydroxide (Milk Of Magnesia Liq*)  30 ml PO BID [facility]


   Last Admin: 19 09:52 Dose:  Not Given


Magnesium Hydroxide (Milk Of Magnesia Liq*)  30 ml PO Q6H PRN


   PRN Reason: constipation


Morphine Sulfate (Morphine Inj (Syringe))*)  2 mg IV Q4H PRN


   PRN Reason: PAIN - BREAKTHROUGH


Ondansetron HCl (Zofran Inj*)  4 mg IV Q6H PRN


   PRN Reason: nausea


Ondansetron HCl (Zofran Odt Tab*)  4 mg PO Q6H PRN


   PRN Reason: NAUSEA


Oxycodone HCl (Roxycodone Tab*)  10 mg PO Q4H PRN


   PRN Reason: PAIN - SEVERE


Oxycodone/Acetaminophen (Percocet 5/325 Tab*)  1 tab PO Q4H PRN


   PRN Reason: PAIN - MILD


Oxycodone/Acetaminophen (Percocet 5/325 Tab*)  2 tab PO Q4H PRN


   PRN Reason: PAIN - MODERATE


   Last Admin: 19 20:53 Dose:  2 tab


Paroxetine HCl (Paxil Tab*)  10 mg PO DAILY [facility]


   Last Admin: 19 08:34 Dose:  10 mg








 Vital Signs - 8 hr











  19





  08:11 10:23 11:56


 


Temperature  101.5 F 99 F


 


Pulse Rate  70 64


 


Respiratory 18 18 22





Rate   


 


Blood Pressure  117/67 102/54





(mmHg)   


 


O2 Sat by Pulse  95 94





Oximetry   














  19





  12:16 15:27


 


Temperature  99.1 F


 


Pulse Rate  57


 


Respiratory 18 22





Rate  


 


Blood Pressure  116/68





(mmHg)  


 


O2 Sat by Pulse  97





Oximetry  











Oxygen Devices in Use Now: None


Appearance: alert, NAD


Eyes: No Scleral Icterus, PERRLA


Ears/Nose/Mouth/Throat: NL Teeth, Lips, Gums, Mucous Membranes Moist


Neck: NL Appearance and Movements; NL JVP, Trachea Midline


Respiratory: Symmetrical Chest Expansion and Respiratory Effort, Clear to 

Auscultation


Cardiovascular: NL Sounds; No Murmurs; No JVD, RRR, No Edema


Abdominal: NL Sounds; No Tenderness; No Distention


Extremities: No Edema, No Clubbing, Cyanosis


Skin: - - tender over right hip


Neurological: Alert and Oriented x 3, NL Muscle Strength and Tone


Nutrition: - - NPO


Result Diagrams: 


 19 06:28





 19 06:28


Microbiology and Other Data: 


 Microbiology











 19 14:14 Gram Stain - Final





 Body Fluid Skin and Soft Tissue MRSA/MSSA (PCR - Final





    Mrsa Negative





    S.aureus Negative











Diagnostic Imaging: 





Patient Name:         ANGELITO DE LA O                                           

                        Medical Record#: [id number]


Ordering Physician: Leo Alonzo MD                                          

                        Acct.#: [id number]


:     1960         Age: 58   Sex: M                                   

                        Location: EMERGENCY DEPARTMENT


Exam Date: 19 1209                                                       

                        ADM Status: REG ER


Order Information:                         Aspiration/Injection Hip Right


Accession Number:                          [id number]


CPT:                                       [id number]


Indication: Right hip pain, fever and elevated white blood cell.





Approximately 27 seconds of fluoroscopy time was used.





Using usual aseptic technique and lidocaine as local anesthetic a 22-gauge 

needle was


placed within the right hip joint. Aspiration was attempted. 2 mL of saline was 

then


injected into the joint space. The needle was then manipulated. Approximately 

15 mL of


pink murky fluid was then aspirated. The specimen was sent for bacteriology.





IMPRESSION: Successful aspiration and localization of the right hip with 

aspiration of 15


mL of murky pink fluid from the right hip joint.





Patient Name:         ANGELITO DE LA O                                           

                        Medical Record#: [id number]


Ordering Physician: Elisa HEATH                                        

                        Acct.#: [id number]


:     1960         Age: 58   Sex: M                                   

                        Location: EMERGENCY DEPARTMENT


Exam Date: 19 0808                                                       

                        ADM Status: REG ER


Order Information:                         HIP RIGHT 2 VIEWS AND PELVIS


Accession Number:                          [id number]


CPT:                                       [id number]


Indication: RIGHT hip pain. Post hip replacement in .





Comparison: 2012





Technique: AP pelvis and AP and frog-leg lateral views RIGHT hip.





Report: Normally located RIGHT total hip replacement. Negative for 

periprosthetic fracture


or stigmata of loosening. Negative for pelvic fracture or joint diastases. 

Lumbar sacral


spine degenerative spondylosis and facet joint osteoarthritis. Unremarkable 

soft tissue


contours. 





IMPRESSION: 


#. Unremarkable radiographic follow-up of prosthetic RIGHT hip.




















Assess/Plan/Problems-Billing


Assessment and Recommendations: 





This is a 58 year old male that presents to the ER with septic right hip 

prosthesis and now found to have positive blood cultures.























- Patient Problems


(1) Bacteremia due to Streptococcus


Code(s): R78.81 - BACTEREMIA; B95.5 - UNSP STREPTOCOCCUS AS THE CAUSE OF 

DISEASES CLASSD [facility]   SNOMED Code(s): 460442358119


   Comment: 


 - Strep mitis/strep oralis in joint and blood cultures


 - Lyme atb negative


 - ID following, DC vanco and continue ceftriaxone


 - Tylenol for fevers, IVF   





(2) Prosthetic hip infection


Code(s): T84.59XA - INFECT/INFLM REACTION DUE TO OTH INTERNAL JOINT PROSTH, INIT

; Z96.649 - PRESENCE OF UNSPECIFIED ARTIFICIAL HIP JOINT   SNOMED Code(s): 

225318642


   Comment: 


 - Hip aspiration 19 yielded strep 


 - Plan for surgical washout today with Dr. Mckeon


 - Pain control, supportive care   





(3) HTN (hypertension)


Code(s): I10 - ESSENTIAL (PRIMARY) HYPERTENSION   SNOMED Code(s): 15071086


   Comment: 


 - Stable on atenolol   





(4) DVT prophylaxis


Code(s): Z29.9 - ENCOUNTER FOR PROPHYLACTIC MEASURES, UNSPECIFIED   SNOMED Code(

s): 946981201


   Comment:  


 - Restart heparin post-op   





(5) Full code status


Code(s): Z78.9 - OTHER SPECIFIED HEALTH STATUS   SNOMED Code(s): 021176395


   Comment: 


   


Status and Disposition: 





Inpatient, dispo per ortho

## 2019-07-18 NOTE — CONS
CONSULTATION REPORT:

 

DATE OF CONSULT:  07/18/19

 

PRIMARY CARE PROVIDER:  Dr. Jodi Nielsen.

 

PROVIDER REQUESTING CONSULTATION:  IVANA Yao

 

CONSULTING SERVICE:  Infectious Disease.

 

PROVIDER:  James Cassidy NP

 

ATTENDING PROVIDER:  Dr. Yosvany Cote.* (DICTATED BY JAMES CASSIDY NP)

 

REASON FOR CONSULTATION:  Septic right hip in the setting of a prosthetic joint.



IMPRESSION:

1.  Septic right hip in the setting of a prosthetic right hip.  Synovial fluid 
was joana and cloudy, white blood cell count 29,423, rbc's 12,641, total cell 
count 100, neutrophils 98, lymphocytes 2.  Blood cultures from admission with 
Streptococcus mitis and Streptococcus oralis 3/4 bottles.  Fluid from the right 
hip with no growth to date.  The patient is currently febrile with a fever of 
101.5.  He has been on vancomycin and Zosyn since admission.  Orthopedics is 
planning to take him to the operating room later today or tomorrow for incision 
and drainage of the hip.

2.  Hypertension.

3.  Hyperlipidemia.

 

RECOMMENDATIONS:  Stop vancomycin and zosyn. Start Ceftriaxone 2 gm IV daily. 
He should have a transthoracic echocardiogram to evaluate for endocarditis. 

 

HISTORY OF PRESENT ILLNESS:  Mr. Murray is a 58-year-old male with past medical 
history significant for hypertension, hyperlipidemia, and a total right hip 
arthroplasty in 2012, who states that he was in his usual state of health until 
2 days prior when he developed right hip pain with fevers, chills, and 
sweating.  His pain progressed and became so bad that he was unable to ambulate
, and he decided to present to the emergency room for evaluation of his 
symptoms.

 

While in the emergency room, he had right hip x-ray that was unremarkable.  He 
also had a right hip aspiration for 15 cc of reported murky pink fluid.  He was 
seen in consultation by Orthopedics and was admitted for suspected septic right 
hip.  



While in the hospital, he had blood cultures returned with 3/4 bottles positive 
for Strep mitis, Strep oralis.  Body fluid has no growth to date.  He has been 
afebrile with the exception of a 101.5 temperature this morning. His initial 
leukocytosis of 18,800 has improved to 11.1 this morning.  He reports working 
[place] and frequently gets tick bites, the last tick bite he had was 
2 weeks ago.  He had one on his left cheek that he initially thought was a mole 
and this fell off.  He continues to report intermittent fevers, chills, 
diaphoresis.  He denies shortness of breath, cough, muscle pain, urinary 
symptoms such as urgency, frequency, dysuria.  He denies abdominal pain.  He 
denies nausea, vomiting, diarrhea, or recent travel.  He does report right hip 
discomfort. 

 

PAST MEDICAL HISTORY:

1.  Hypertension.

2.  Hyperlipidemia.

 

PAST SURGICAL HISTORY:

1.  Status post hernia repair.

2.  Status post right total hip arthroplasty in 2012.

 

HOME MEDICATIONS:  Include:

1.  Paxil 10 mg by mouth daily.

2.  Atenolol 25 mg by mouth daily.

 

HOSPITAL MEDICATIONS:  

1.  Acetaminophen 975 mg by mouth every 8 hours as needed for fever or pain.

2.  Atenolol 25 mg by mouth daily.

3.  Flexeril 10 mg by mouth 3 times daily as needed for muscle spasms.

4.  Benadryl 25 mg IV or p.o. every 6 hours as needed for itching.

5.  Colace 100 mg by mouth twice daily.

6.  Lactated Ringers 100 mL an hour intravenous.

7.  Lactulose 30 mL by mouth twice daily as needed for constipation.

8.  Milk of magnesia 30 mL by mouth twice daily until BMs and 30 mL by mouth 
every 6 hours as needed for constipation.

9.  Morphine sulfate 2 mg IV every 4 hours as needed for pain.

10.  Zofran 4 mg IV or by mouth every 6 hours as needed for nausea.

11.  Oxycodone 10 mg by mouth every 4 hours as needed for pain.

12.  Percocet 5/325 one to two tablets by mouth every 4 hours as needed for 
pain.

13.  Paxil 10 mg by mouth daily.

14.  Zosyn 3.375 g IV every 6 hours.

15.  Vancomycin 1250 mg IV every 8 hours.

 

ALLERGIES:  No known drug allergies.

 

FAMILY HISTORY:  Denies family history of recurrent or resistant infections, 
coronary artery disease, diabetes or cancer.

 

SOCIAL HISTORY:  He denies alcohol, tobacco, or recreational drug use.  He is a 
former smoker.  He reports quitting 20 years ago.  Prior to that, he had a half 
to 1 pack a day smoking history for approximately 20 years.

 

REVIEW OF SYSTEMS:  I performed a 10-point review of systems.  All other 
pertinent positives and negatives are mentioned in the history of present 
illness.  The remaining review of systems are negative.

 

PHYSICAL EXAMINATION:  Vital Signs:  Temperature 101.5, heart rate 70, 
respiratory rate 18, O2 sat 95% on room air, blood pressure 117/67.  General 
Appearance: Alert, pleasant, appears to be in no acute distress.  Head:  
Normocephalic, atraumatic.  ENT:  Pupils are equal and reactive to light.  
Extraocular movements are intact.  No conjunctival hemorrhage.  Mucous 
membranes are moist.  Neck: Supple.  No lymphadenopathy.  Neurological:  
Cranial nerves II through XII are grossly intact.  He is alert and oriented x4.
  Cardiovascular:  Regular rate and rhythm.  S1, S2 present.  No murmurs, rubs, 
or gallops heard.  Abdomen:  Soft, nontender, and nondistended.  Bowel sounds 
present x4.  Extremities:  No lower extremity edema.  DP and PT pulses 2+ and 
symmetric.  Musculoskeletal:  No clubbing or cyanosis noted.  The patient 
exhibits good strength in all extremities.  There is no pain with palpation of 
the right or left knee.  There is no crepitus.  Full range of motion.  No 
warmth.  There is tenderness with palpation of the left hip. He has limited 
range of motion due to pain and negative log roll bilateral.  No tenderness 
with palpation of the neck, spine, or back.  Skin:  No rashes or abnormalities 
seen.  Psychological:  Calm and cooperative.

 

DIAGNOSTIC STUDIES/LABORATORY DATA:  Sodium 136, potassium 3.8, chloride 105, 
CO2 of 24, BUN 17, creatinine 0.90, glucose 123.  White blood cell count 11.1, 
hemoglobin 13.7, hematocrit 40, platelet count 170.  ESR on admission 7.  CRP 
on admission was 150.34.  Synovial fluid per above and blood cultures per above.

 

Please see impression and recommendations outlined above.  Recommendations have 
been discussed with IVANA Yao.

 

Thank you for asking us to see Mr. Murray in consultation. 



The case has been reviewed with my attending Dr. Yosvany Cote, who agrees 
with the plan of care.

 

Reviewed by RUTHY SMALLWOOD  07/19/19  1554

 

[id number]/[facility] #: [id number]

MTDD

## 2019-07-19 LAB
ANION GAP SERPL CALC-SCNC: 7 MMOL/L (ref 2–11)
BUN SERPL-MCNC: 10 MG/DL (ref 6–24)
BUN/CREAT SERPL: 15.2 (ref 8–20)
CALCIUM SERPL-MCNC: 7.9 MG/DL (ref 8.6–10.3)
CHLORIDE SERPL-SCNC: 107 MMOL/L (ref 101–111)
GLUCOSE SERPL-MCNC: 139 MG/DL (ref 70–100)
HCO3 SERPL-SCNC: 24 MMOL/L (ref 22–32)
HCT VFR BLD AUTO: 36 % (ref 42–52)
HGB BLD-MCNC: 12.1 G/DL (ref 14–18)
PLATELET # BLD AUTO: 164 10^3/UL (ref 150–450)
POTASSIUM SERPL-SCNC: 4.2 MMOL/L (ref 3.5–5)
SODIUM SERPL-SCNC: 138 MMOL/L (ref 135–145)

## 2019-07-19 RX ADMIN — CEFAZOLIN SCH MLS/HR: 1 INJECTION, POWDER, FOR SOLUTION INTRAVENOUS at 14:36

## 2019-07-19 RX ADMIN — ACETAMINOPHEN SCH MG: 325 TABLET ORAL at 14:36

## 2019-07-19 RX ADMIN — CEFAZOLIN SCH MLS/HR: 1 INJECTION, POWDER, FOR SOLUTION INTRAVENOUS at 05:40

## 2019-07-19 RX ADMIN — DOCUSATE SODIUM SCH MG: 100 CAPSULE, LIQUID FILLED ORAL at 08:17

## 2019-07-19 RX ADMIN — OXYCODONE HYDROCHLORIDE AND ACETAMINOPHEN PRN TAB: 5; 325 TABLET ORAL at 20:16

## 2019-07-19 RX ADMIN — ACETAMINOPHEN SCH MG: 325 TABLET ORAL at 20:17

## 2019-07-19 RX ADMIN — PAROXETINE SCH MG: 10 TABLET, FILM COATED ORAL at 08:18

## 2019-07-19 RX ADMIN — ACETAMINOPHEN SCH: 325 TABLET ORAL at 05:07

## 2019-07-19 RX ADMIN — SODIUM CHLORIDE, SODIUM LACTATE, POTASSIUM CHLORIDE, AND CALCIUM CHLORIDE SCH MLS/HR: 600; 310; 30; 20 INJECTION, SOLUTION INTRAVENOUS at 00:00

## 2019-07-19 RX ADMIN — DOCUSATE SODIUM SCH MG: 100 CAPSULE, LIQUID FILLED ORAL at 20:17

## 2019-07-19 RX ADMIN — APIXABAN SCH MG: 2.5 TABLET, FILM COATED ORAL at 20:17

## 2019-07-19 RX ADMIN — OXYCODONE HYDROCHLORIDE AND ACETAMINOPHEN PRN TAB: 5; 325 TABLET ORAL at 08:17

## 2019-07-19 RX ADMIN — MAGNESIUM HYDROXIDE SCH ML: 400 SUSPENSION ORAL at 20:18

## 2019-07-19 RX ADMIN — CEFTRIAXONE SODIUM SCH MLS/HR: 2 INJECTION, POWDER, FOR SOLUTION INTRAVENOUS at 14:59

## 2019-07-19 RX ADMIN — ATENOLOL SCH MG: 25 TABLET ORAL at 08:18

## 2019-07-19 RX ADMIN — APIXABAN SCH MG: 2.5 TABLET, FILM COATED ORAL at 08:17

## 2019-07-19 RX ADMIN — MAGNESIUM HYDROXIDE SCH ML: 400 SUSPENSION ORAL at 08:18

## 2019-07-19 NOTE — PN
Subjective


Date of Service: 19


Interval History: 





C/o slight pain at this surgical site.  Denies chest pain or shortness of 

breath.  denies fever or chills.  denies abd pain n/v/d. 


Family History: Unchanged from Admission - non-contributory


Social History: Unchanged from Admission - remote history of tobacco, denies 

ETOH, no drugs, works full time, lives with wife


Past Medical History: Unchanged from Admission - HTN, HLP, OA





Objective


Active Medications: 








Acetaminophen (Tylenol Tab*)  975 mg PO Q8H PRN


   PRN Reason: FEVER/PAIN


   Last Admin: 19 10:28 Dose:  975 mg


Acetaminophen (Tylenol Tab*)  975 mg PO Q8H [facility]


   Last Admin: 19 20:17 Dose:  650 mg


Apixaban (Eliquis*)  2.5 mg PO BID [facility]


   Last Admin: 19 20:17 Dose:  2.5 mg


Atenolol (Tenormin Tab*)  25 mg PO DAILY [facility]


   Last Admin: 19 08:18 Dose:  25 mg


Bisacodyl (Dulcolax Supp*)  10 mg DC DAILY PRN


   PRN Reason: constipation


Cyclobenzaprine HCl (Flexeril Tab*)  10 mg PO TID PRN


   PRN Reason: SPASMS


Diphenhydramine HCl (Benadryl Iv*)  25 mg IV Q6H PRN


   PRN Reason: itching


Diphenhydramine HCl (Benadryl Po*)  25 mg PO Q6H PRN


   PRN Reason: itching


Docusate Sodium (Colace Cap*)  100 mg PO BID [facility]


   Last Admin: 19 20:17 Dose:  100 mg


Ceftriaxone Sodium 2 gm/ (Sodium Chloride)  100 mls @ 200 mls/hr IVPB Q24H [facility]


   Last Admin: 19 14:59 Dose:  200 mls/hr


Lactated Ringer's (Lactated Ringers 1000 Ml Bag*)  1,000 mls @ 100 mls/hr IV 

PER RATE [facility]


   Last Admin: 19 00:00 Dose:  100 mls/hr


Lactulose (Lactulose*)  30 ml PO Q6H PRN


   PRN Reason: constipation


Magnesium Hydroxide (Milk Of Magnesia Liq*)  30 ml PO BID [facility]


   Last Admin: 19 20:18 Dose:  30 ml


Magnesium Hydroxide (Milk Of Magnesia Liq*)  30 ml PO Q6H PRN


   PRN Reason: constipation


Morphine Sulfate (Morphine Inj (Syringe))*)  2 mg IV Q2H PRN


   PRN Reason: PAIN - UNRELIEVED


Ondansetron HCl (Zofran Inj*)  4 mg IV Q6H PRN


   PRN Reason: nausea


Ondansetron HCl (Zofran Odt Tab*)  4 mg PO Q6H PRN


   PRN Reason: NAUSEA


Oxycodone HCl (Roxycodone Tab*)  10 mg PO Q4H PRN


   PRN Reason: PAIN - SEVERE


Oxycodone/Acetaminophen (Percocet 5/325 Tab*)  1 tab PO Q4H PRN


   PRN Reason: PAIN - MILD


   Last Admin: 19 20:16 Dose:  1 tab


Oxycodone/Acetaminophen (Percocet 5/325 Tab*)  2 tab PO Q4H PRN


   PRN Reason: PAIN - MODERATE


Paroxetine HCl (Paxil Tab*)  10 mg PO DAILY [facility]


   Last Admin: 19 08:18 Dose:  10 mg


Polyethylene Glycol/Electrolytes (Miralax*)  17 gm PO DAILY PRN


   PRN Reason: Constipation


Tramadol HCl (Ultram*)  50 mg PO Q6H PRN


   PRN Reason: PAIN


Trazodone HCl (Desyrel Tab*)  25 mg PO BEDTIME PRN


   PRN Reason: insomnia








 Vital Signs - 8 hr











  19





  15:56 16:07 19:54


 


Temperature 98.5 F 98.5 F 98.3 F


 


Pulse Rate 67 71 70


 


Respiratory 16 18 16





Rate   


 


Blood Pressure 113/64 127/56 128/64





(mmHg)   


 


O2 Sat by Pulse 95 96 98





Oximetry   














  19





  20:16 20:23


 


Temperature  


 


Pulse Rate  


 


Respiratory 18 18





Rate  


 


Blood Pressure  





(mmHg)  


 


O2 Sat by Pulse  





Oximetry  











Oxygen Devices in Use Now: Nasal Cannula


Appearance: alert and oriented resting in bed, no acute distress


Eyes: No Scleral Icterus


Ears/Nose/Mouth/Throat: Clear Oropharnyx, Mucous Membranes Moist


Neck: NL Appearance and Movements; NL JVP, Trachea Midline


Respiratory: Symmetrical Chest Expansion and Respiratory Effort, Clear to 

Auscultation


Cardiovascular: NL Sounds; No Murmurs; No JVD, No Edema


Abdominal: NL Sounds; No Tenderness; No Distention


Extremities: No Edema, No Clubbing, Cyanosis


Skin: No Rash or Ulcers, - - dressing dry and intact to right hip 


Neurological: Alert and Oriented x 3


Nutrition: Taking PO's


Result Diagrams: 


 19 05:23





 19 05:23


Microbiology and Other Data: 


 Microbiology











 19 14:14 Gram Stain - Final





 Body Fluid Skin and Soft Tissue MRSA/MSSA (PCR - Final





    Mrsa Negative





    S.aureus Negative











Diagnostic Imaging: 





Patient Name:         ANGELITO DE LA O                                           

                        Medical Record#: [id number]


Ordering Physician: Leo Alonzo MD                                          

                        Acct.#: [id number]


:     1960         Age: 58   Sex: M                                   

                        Location: EMERGENCY DEPARTMENT


Exam Date: 19 1209                                                       

                        ADM Status: REG ER


Order Information:                         Aspiration/Injection Hip Right


Accession Number:                          [id number]


CPT:                                       [id number]


Indication: Right hip pain, fever and elevated white blood cell.





Approximately 27 seconds of fluoroscopy time was used.





Using usual aseptic technique and lidocaine as local anesthetic a 22-gauge 

needle was


placed within the right hip joint. Aspiration was attempted. 2 mL of saline was 

then


injected into the joint space. The needle was then manipulated. Approximately 

15 mL of


pink murky fluid was then aspirated. The specimen was sent for bacteriology.





IMPRESSION: Successful aspiration and localization of the right hip with 

aspiration of 15


mL of murky pink fluid from the right hip joint.





Patient Name:         ANGELITO DE LA O                                           

                        Medical Record#: [id number]


Ordering Physician: Elisa HEATH                                        

                        Acct.#: [id number]


:     1960         Age: 58   Sex: M                                   

                        Location: EMERGENCY DEPARTMENT


Exam Date: 19 0808                                                       

                        ADM Status: REG ER


Order Information:                         HIP RIGHT 2 VIEWS AND PELVIS


Accession Number:                          [id number]


CPT:                                       [id number]


Indication: RIGHT hip pain. Post hip replacement in .





Comparison: 2012





Technique: AP pelvis and AP and frog-leg lateral views RIGHT hip.





Report: Normally located RIGHT total hip replacement. Negative for 

periprosthetic fracture


or stigmata of loosening. Negative for pelvic fracture or joint diastases. 

Lumbar sacral


spine degenerative spondylosis and facet joint osteoarthritis. Unremarkable 

soft tissue


contours. 





IMPRESSION: 


#. Unremarkable radiographic follow-up of prosthetic RIGHT hip.




















Assess/Plan/Problems-Billing


Assessment and Recommendations: 





This is a 58 year old male that presents to the ER with septic right hip 

prosthesis and now found to have positive blood cultures.























- Patient Problems


(1) Bacteremia due to Streptococcus


Current Visit: Yes   Status: Acute   Code(s): R78.81 - BACTEREMIA; B95.5 - UNSP 

STREPTOCOCCUS AS THE CAUSE OF DISEASES [facility]SD [facility]   SNOMED Code(s): 

338728318575


   Comment: 


 - Strep mitis/strep oralis in joint and blood cultures


 - Lyme atb negative


 - ID following, continue ceftriaxone


 - afebrile overnight    





(2) HTN (hypertension)


Current Visit: Yes   Status: Acute   Code(s): I10 - ESSENTIAL (PRIMARY) 

HYPERTENSION   SNOMED Code(s): 82482062


   Comment: 


 - Stable on atenolol   





(3) Prosthetic hip infection


Current Visit: Yes   Status: Acute   Code(s): T84.59XA - INFECT/INFLM REACTION 

DUE TO OTH INTERNAL JOINT PROSTH, INIT; Z96.649 - PRESENCE OF UNSPECIFIED 

ARTIFICIAL HIP JOINT   SNOMED Code(s): 310461068


   Comment: 


 - Hip aspiration 19 yielded strep 


 - Plan for surgical washout completed POD #1


 - Pain control, supportive care


- will need picc line when blood cultures are cleared for Long term IV 

antibiotics   





(4) DVT prophylaxis


Current Visit: Yes   Status: Acute   Code(s): Z29.9 - ENCOUNTER FOR 

PROPHYLACTIC MEASURES, UNSPECIFIED   SNOMED Code(s): 483970585


   Comment:  


Eliquis   





(5) Full code status


Current Visit: Yes   Status: Acute   Code(s): Z78.9 - OTHER SPECIFIED HEALTH 

STATUS   SNOMED Code(s): 900334371


   Comment: 


   


Status and Disposition: 





Inpatient, dispo per ortho

## 2019-07-19 NOTE — OP
DATE OF OPERATION:  07/18/19 - ROOM #331

 

DATE OF BIRTH:  [birth year]

 

ATTENDING SURGEON:  Dea Mai MD

 

ASSISTANT:  IVANA Zamora.  Ms. Cristinana maria did help throughout the 
case with preparation of the leg, wound retraction, manipulation of the hip, 
and wound closure.

 

ANESTHESIOLOGIST:  Dr. Bar.

 

ANESTHESIA:  General.

 

PRE-OP DIAGNOSIS:  Infected right total hip arthroplasty.

 

POST-OP DIAGNOSIS:  Infected right total hip arthroplasty.

 

OPERATIVE PROCEDURE:  Open irrigation and debridement of right total hip 
arthroplasty with polyethylene liner and femoral head exchange.

 

COMPLICATIONS:  None.

 

SPECIMEN:  10 cc of purulent fluid and multiple culture swabs were obtained and 
sent to microbiology for aerobic, anaerobic, mycobacterial, and fungal cultures.

 

COMPLICATIONS:  None.

 

ESTIMATED BLOOD LOSS:  300 cc.

 

HARDWARE USED:  A Trilogy 36 liner for 56 cup with 3.5 offset was used.  For 
the head, a 36+0 VerSys [place] metal head.

 

BRIEF HISTORY/INDICATIONS:  Mr. Murray is a 58-year-old gentleman who had total 
hip arthroplasty with Dr. Nguyễn in 2012.  He had no postoperative complications 
and did well.  Approximately 48 hours ago, the patient reports that he began to 
have hip pain while at work and within 4 hours, the pain was 10/10, he was 
unable to ambulate or bear weight on the right hip.  He developed fevers and 
chills.  He went to the emergency room the next morning on 07/17/19, and was 
admitted for possible infected right total hip arthroplasty.  He was found to 
have leukocytosis. Aspiration of the hip joint yielded purulent fluid and the 
cell count was approximately 30,000 white blood cells.  The patient was given 
different options including non-operative treatment and operative treatment.  
He wished to progress to open irrigation and debridement of the hip joint with 
polyethylene and femoral head exchange.  He understood this was an appropriate 
intervention because of the acute nature of the infection onset.  He understood 
he could need repeat washout and extended IV antibiotics.  He understood he 
could also possibly go on to need explant of the hardware.

 

Informed consent was obtained from the patient.  He understood the risks of 
surgery included, but were not limited to bleeding, infection, damage to nearby 
structures, continued pain, need for further surgery, intraoperative fracture, 
nerve palsy, hardware failure or loosening, dislocation, leg length discrepancy
, continued infection, stroke, heart attack, blood clot, and death.  He wished 
to proceed.

 

Decision was made to take the patient to the operating room immediately.

 

INTRAOPERATIVE FINDINGS:  Intraoperatively, the purulent segments in joint 
capsule was opened.  There was minimal necrotic or infected-appearing tissue.  
Femoral stem and acetabular cup were without any evidence of loosening.  There 
was minimal corrosion at the femoral head-neck junction.  Polyethylene liner 
has a small amount of wear but no significant wear.

 

DESCRIPTION OF PROCEDURE:  Mr. Murray was identified in the preanesthesia unit.  
His right lower extremity was marked as the correct operative side.  Informed 
consent was signed and placed in the chart.  The patient was taken to the 
operating room and placed under anesthesia.  A Whaley catheter was placed.  The 
patient was placed in the left lateral decubitus position on the pegboard.  All 
bony prominences were well padded.  Right lower extremity was prepped and 
draped in the usual sterile fashion.  Preop time-out was made to correctly 
identify the patient, side, and site. Appropriate perioperative antibiotics 
were given within 1 hour of incision.

 

The patient's prior hip incision was opened with a 10-blade.  Electrocautery 
was used to dissect down to the lateral fascial layer.  The lateral fascial 
layer was incised in line with the skin incision.  A significant amount of scar 
tissue was cleared both anteriorly and posteriorly.  A Charnley retractor was 
placed. Electrocautery was used to make a single posterior capsular flap.  As 
soon as this capsule was incised, there was eruption of significant amount of 
purulent fluid. This was collected with a syringe as well as multiple culture 
swabs and sent to microbiology.  The capsule was tagged with #5 Ethibond.  The 
hip was copiously irrigated with 6 L of sterile saline and bacitracin. Scar 
tissue was excised using electrocautery.  The hip was carefully dislocated.

 

The femoral head was removed using a bone tamp.  There was a small amount of 
corrosion at the head-neck junction.  Femur was retracted anteriorly.  Scar 
tissue around the acetabulum was abundant and this was carefully removed using 
electrocautery.  Osteotome and mallet were used carefully to remove the 
polyethylene.  Polyethylene was noted to have minimal wear.  Further dissection 
around the acetabular cup and removal of scar tissue was performed to ensure 
proper placement of the new liner.  Another 3 L of sterile saline was used to 
irrigate the hip joint.  No additional purulent fluid or necrotic tissue was 
identified.

 

The Trilogy 36 polyethylene for a 56 cup was chosen with 3.5 offset.  This 
liner was impacted into the acetabulum without difficulty.  Stability of the 
liner was checked and rechecked and noted to be stable.  A 36+0 VerSys femoral 
head was chosen and impacted onto the femoral neck.  Hip was reduced and taken 
through range of motion.  The hip was stable in all positions.

 

Another 3 L of fluid was used to copiously irrigate the hip joint.  The 
previously tagged capsule was reapproximated with the posterior femur through 2 
trochanteric drill holes.  The lateral fascial layer was closed using 
interrupted #1 Vicryl. The rest of the incision was closed in a layered fashion 
using 0 and 2-0 Vicryl. Skin was closed using running 3-0 nylon suture.  
Sterile Xeroform, 4x4s, and paper tape were used to cover the incision.

 

The patient's anesthesia was reversed without difficulty.  He was taken to the 
PACU in stable condition.  Intended weight-bearing will be weightbearing as 
tolerated with strict posterior hip precautions.  He will have an Infectious 
Disease consult, PICC line, and IV antibiotics.

 

We will monitor him carefully, follow cultures to refine antibiotic 
prescription. The patient understands he may need an additional washout or 
further surgery.

 

 [id number]/CPS #: [id number]

MTDD

## 2019-07-19 NOTE — PN
Progress Note





- Progress Note


Date of Service: 07/19/19


SOAP: 


Subjective:


CC: Right prosthetic hip infection





HPI:


Mr. Murray is a 59 yo male with PMH significant for HTN, HLD, and a right total 

hip arthroplasty in 2012, he presented to the hospital with significant pain in 

the right hip with fevers and chills. Denies fever, chills, ABD pain, nausea, 

vomiting, diarrhea. Right hip pain is improving, denies right knee pain. 





Objective:


 Vital Signs - 8 hr











  07/19/19 07/19/19 07/19/19





  03:52 04:11 07:16


 


Temperature  97.8 F 97.4 F


 


Pulse Rate  60 58


 


Respiratory  16 16





Rate   


 


Blood Pressure  107/65 122/67





(mmHg)   


 


O2 Sat by Pulse 99 97 99





Oximetry   








Physical Exam:


General: NAD, sitting up in a chair


Neurological: Alert and Oriented x4


HEENT: Moist MM, no thrush


Cardiovascular: Heart rate regular, no murmur


Respiratory: Lung sounds clear


Abdominal: Bowel sounds present; ABD soft, non tender and non distended


MSK: No tenderness with palpation of the neck, spin or back. No edema, 

tenderness or effusion of the right knee


Skin: No rash, face is flush





 Laboratory Results - last 24 hr











  07/17/19 07/18/19 07/19/19





  10:11 14:47 05:23


 


Hgb    12.1 L


 


Hct    36 L


 


Plt Count    164


 


MPV    9.1


 


Vancomycin Trough   13.0 


 


Lyme Total Antibody  Negative  














  07/19/19





  05:23


 


Sodium  138


 


Potassium  4.2


 


Chloride  107


 


Carbon Dioxide  24


 


Anion Gap  7


 


BUN  10


 


Creatinine  0.66 L


 


Est GFR ( Amer)  151.1


 


Est GFR (Non-Af Amer)  124.9


 


BUN/Creatinine Ratio  15.2


 


Glucose  139 H


 


Calcium  7.9 L








 Microbiology











 07/17/19 10:05 Aerobic Blood Culture - Final





 Blood Venous    Strep Mitis/Strep Oralis





 Anaerobic Blood Culture - Preliminary





    No Growth Day 2


 


 07/17/19 10:11 Aerobic Blood Culture - Final





 Blood Venous    Strep Mitis/Strep Oralis





 Anaerobic Blood Culture - Final





    Streptococcus Mitis/Oralis


 


 07/18/19 18:30 Skin and Soft Tissue MRSA/MSSA (PCR - Final





 Hip Right    Mrsa Negative





    S.aureus Negative





 Gram Stain - Final


 


 07/18/19 18:30 Gram Stain - Final





 Body Fluid Skin and Soft Tissue MRSA/MSSA (PCR - Final





    Mrsa Negative





    S.aureus Negative


 


 07/17/19 14:14 Gram Stain - Final





 Body Fluid Skin and Soft Tissue MRSA/MSSA (PCR - Final





    Mrsa Negative





    S.aureus Negative








Assessment:


1. Right prosthetic hip infection. Synovial fluid from the knee with WBCs 29,

423. S/P I+D with polyethylene liner and femoral head exchange by Dr. Mai, 

POD #1. Afebrile for 24 hours and leukocytosis trending down on last labs, not 

checked today. Pain in the right hip has improved. 


2. Strep viridans bacteremia. Patient with a broken tooth, suspect this may be 

the cause of bacteremia. TTE without vegetation. 


3. Anemia. Suspect secondary to acute postop blood loss.





Plan:


Continue Ceftriaxone 2gm IV, will need an extended course of IV ABX. Day 2/56. 

Will order repeat blood cultures today. Wait for repeat blood culture to clear 

before placing a PICC line in the setting of gram positive bacteremia.

## 2019-07-19 NOTE — PN
Progress Note





- Progress Note


Date of Service: 07/19/19


SOAP: 


Subjective:


[]Patient seen at bedside, wife present.  His pain is well managed right hip.  

Denies fever or chills, SOB, CP or dizziness.  








Objective:


[]


 Vital Signs











Temp  97.4 F   07/19/19 07:16


 


Pulse  68   07/19/19 08:15


 


Resp  16   07/19/19 08:34


 


BP  122/67   07/19/19 07:16


 


Pulse Ox  99   07/19/19 07:16








 Intake & Output











 07/18/19 07/19/19 07/19/19





 18:59 06:59 18:59


 


Intake Total 2495 2600 210


 


Output Total 200 1975 


 


Balance 2295 625 210


 


Intake:   


 


  IV Fluids 2275 2200 


 


    ABX - VANCOMYCIN 270  


 


     2200 


 


    NS 1225  


 


  IVPB 220  


 


    ABX - ZOSYN 220  


 


  Oral 0 400 210


 


Output:   


 


  Urine 200  


 


  Whaley  1725 


 


  Estimated Blood Loss  250 


 


Other:   


 


  Estimated Void Medium  


 


  # Bowel Movements  0 


 


  # Voids 3  








 Laboratory Results - last 24 hr











  07/17/19 07/17/19 07/18/19





  10:11 14:14 14:47


 


Hgb   


 


Hct   


 


Plt Count   


 


MPV   


 


Sodium   


 


Potassium   


 


Chloride   


 


Carbon Dioxide   


 


Anion Gap   


 


BUN   


 


Creatinine   


 


Est GFR ( Amer)   


 


Est GFR (Non-Af Amer)   


 


BUN/Creatinine Ratio   


 


Glucose   


 


Calcium   


 


Fluid Cell Count Rvw By    


 


Vancomycin Trough    13.0


 


Lyme Total Antibody  Negative  














  07/19/19 07/19/19





  05:23 05:23


 


Hgb  12.1 L 


 


Hct  36 L 


 


Plt Count  164 


 


MPV  9.1 


 


Sodium   138


 


Potassium   4.2


 


Chloride   107


 


Carbon Dioxide   24


 


Anion Gap   7


 


BUN   10


 


Creatinine   0.66 L


 


Est GFR ( Amer)   151.1


 


Est GFR (Non-Af Amer)   124.9


 


BUN/Creatinine Ratio   15.2


 


Glucose   139 H


 


Calcium   7.9 L


 


Fluid Cell Count Rvw By  


 


Vancomycin Trough  


 


Lyme Total Antibody  








 Microbiology











 07/17/19 10:05 Aerobic Blood Culture - Final





 Blood Venous    Strep Mitis/Strep Oralis





 Anaerobic Blood Culture - Preliminary





    No Growth Day 2


 


 07/17/19 10:11 Aerobic Blood Culture - Final





 Blood Venous    Strep Mitis/Strep Oralis





 Anaerobic Blood Culture - Final





    Streptococcus Mitis/Oralis


 


 07/18/19 18:30 Skin and Soft Tissue MRSA/MSSA (PCR - Final





 Hip Right    Mrsa Negative





    S.aureus Negative





 Gram Stain - Final


 


 07/18/19 18:30 Gram Stain - Final





 Body Fluid Skin and Soft Tissue MRSA/MSSA (PCR - Final





    Mrsa Negative





    S.aureus Negative


 


 07/17/19 14:14 Gram Stain - Final





 Body Fluid Skin and Soft Tissue MRSA/MSSA (PCR - Final





    Mrsa Negative





    S.aureus Negative








Right hip dressing is intact and dry


+Df right ankle


sensation and circulation intact RLE


Calf NT and soft








Assessment:


[]s/p I&D, poly exchange infected right total hip arthroplasty POD #1








Plan:


[]Infectious disease consult- IV antibiotics, currently on Ceftriaxone, 

Cefazolin


  Eliquis for DVT prophylaxis


  WBAT RLE


  Dressing change 7/20

## 2019-07-20 LAB
ANION GAP SERPL CALC-SCNC: 7 MMOL/L (ref 2–11)
BASOPHILS # BLD AUTO: 0 10^3/UL (ref 0–0.2)
BUN SERPL-MCNC: 12 MG/DL (ref 6–24)
BUN/CREAT SERPL: 16.9 (ref 8–20)
CALCIUM SERPL-MCNC: 7.7 MG/DL (ref 8.6–10.3)
CHLORIDE SERPL-SCNC: 108 MMOL/L (ref 101–111)
EOSINOPHIL # BLD AUTO: 0.1 10^3/UL (ref 0–0.6)
GLUCOSE SERPL-MCNC: 104 MG/DL (ref 70–100)
HCO3 SERPL-SCNC: 21 MMOL/L (ref 22–32)
HCT VFR BLD AUTO: 37 % (ref 42–52)
HGB BLD-MCNC: 12.6 G/DL (ref 14–18)
LYMPHOCYTES # BLD AUTO: 1.4 10^3/UL (ref 1–4.8)
MCH RBC QN AUTO: 31 PG (ref 27–31)
MCHC RBC AUTO-ENTMCNC: 35 G/DL (ref 31–36)
MCV RBC AUTO: 89 FL (ref 80–94)
MONOCYTES # BLD AUTO: 1 10^3/UL (ref 0–0.8)
NEUTROPHILS # BLD AUTO: 5.5 10^3/UL (ref 1.5–7.7)
NRBC # BLD AUTO: 0 10^3/UL
NRBC BLD QL AUTO: 0.1
PLATELET # BLD AUTO: 200 10^3/UL (ref 150–450)
POTASSIUM SERPL-SCNC: (no result) MMOL/L (ref 3.5–5)
RBC # BLD AUTO: 4.08 10^6 /UL (ref 4.18–5.48)
SODIUM SERPL-SCNC: 136 MMOL/L (ref 135–145)
SPECIMEN SOURCE: (no result)
WBC # BLD AUTO: 8 10^3/UL (ref 3.5–10.8)

## 2019-07-20 RX ADMIN — OXYCODONE HYDROCHLORIDE AND ACETAMINOPHEN PRN TAB: 5; 325 TABLET ORAL at 09:12

## 2019-07-20 RX ADMIN — CEFTRIAXONE SODIUM SCH MLS/HR: 2 INJECTION, POWDER, FOR SOLUTION INTRAVENOUS at 13:57

## 2019-07-20 RX ADMIN — ACETAMINOPHEN SCH MG: 325 TABLET ORAL at 20:28

## 2019-07-20 RX ADMIN — PAROXETINE SCH MG: 10 TABLET, FILM COATED ORAL at 09:12

## 2019-07-20 RX ADMIN — APIXABAN SCH MG: 2.5 TABLET, FILM COATED ORAL at 09:12

## 2019-07-20 RX ADMIN — ATENOLOL SCH MG: 25 TABLET ORAL at 09:13

## 2019-07-20 RX ADMIN — MAGNESIUM HYDROXIDE SCH ML: 400 SUSPENSION ORAL at 09:11

## 2019-07-20 RX ADMIN — APIXABAN SCH MG: 2.5 TABLET, FILM COATED ORAL at 20:28

## 2019-07-20 RX ADMIN — ACETAMINOPHEN SCH: 325 TABLET ORAL at 04:39

## 2019-07-20 RX ADMIN — DOCUSATE SODIUM SCH MG: 100 CAPSULE, LIQUID FILLED ORAL at 09:13

## 2019-07-20 RX ADMIN — MAGNESIUM HYDROXIDE SCH: 400 SUSPENSION ORAL at 20:29

## 2019-07-20 RX ADMIN — DOCUSATE SODIUM SCH: 100 CAPSULE, LIQUID FILLED ORAL at 20:29

## 2019-07-20 RX ADMIN — ACETAMINOPHEN SCH: 325 TABLET ORAL at 12:44

## 2019-07-20 NOTE — PN
Progress Note





- Progress Note


Date of Service: 07/20/19


SOAP: 


Subjective:


[]Patient seen OOB in chair, doing well.  Waiting for 24 hr clear BC and PIC 

line placement.  Pain well managed, did well with PT ambulating today. 








Objective:


[]


 Vital Signs











Temp  97.7 F   07/20/19 07:26


 


Pulse  67   07/20/19 07:26


 


Resp  16   07/20/19 09:12


 


BP  120/71   07/20/19 07:26


 


Pulse Ox  96   07/20/19 07:26








 Intake & Output











 07/19/19 07/20/19 07/20/19





 18:59 06:59 18:59


 


Intake Total 810 730 960


 


Output Total  1000 


 


Balance 810 -270 960


 


Intake:   


 


  Oral 810 730 960


 


Output:   


 


  Urine  1000 


 


Other:   


 


  Estimated Void Medium Medium 


 


  Date of Last Bowel  07/20/2019 





  Movement   


 


  # Bowel Movements  1 


 


  Estimated Stool Amount  Medium 


 


  # Voids 1 1 








 Laboratory Results - last 24 hr











  07/19/19 07/20/19 07/20/19





  05:23 07:58 07:58


 


WBC   8.0 


 


RBC   4.08 L 


 


Hgb   12.6 L 


 


Hct   37 L 


 


MCV   89 


 


MCH   31 


 


MCHC   35 


 


RDW   14 


 


Plt Count   200 


 


MPV   9.8 


 


Neut % (Auto)   68.4 


 


Lymph % (Auto)   17.6 


 


Mono % (Auto)   11.9 


 


Eos % (Auto)   1.6 


 


Baso % (Auto)   0.5 


 


Absolute Neuts (auto)   5.5 


 


Absolute Lymphs (auto)   1.4 


 


Absolute Monos (auto)   1.0 H 


 


Absolute Eos (auto)   0.1 


 


Absolute Basos (auto)   0.0 


 


Absolute Nucleated RBC   0.0 


 


Nucleated RBC %   0.1 


 


Sodium  138   136


 


Potassium  4.2   TNP


 


Chloride  107   108


 


Carbon Dioxide  24   21 L


 


Anion Gap  7   7


 


BUN  10   12


 


Creatinine  0.66 L   0.71


 


Est GFR ( Amer)  151.1   138.9


 


Est GFR (Non-Af Amer)  124.9   114.8


 


BUN/Creatinine Ratio  15.2   16.9


 


Glucose  139 H   104 H


 


Calcium  7.9 L   7.7 L








 Microbiology











 07/17/19 10:05 Aerobic Blood Culture - Final





 Blood Venous    Strep Mitis/Strep Oralis





 Anaerobic Blood Culture - Preliminary





    No Growth Day 3


 


 07/18/19 18:30 Anaerobic Culture - Preliminary





 Wound    No Growth Day 1


 


 07/18/19 18:30 Skin and Soft Tissue MRSA/MSSA (PCR - Final





 Hip Right    Mrsa Negative





    S.aureus Negative





 Gram Stain - Final





 Wound Culture - Preliminary





    No Growth Day 1


 


 07/18/19 18:30 Gram Stain - Final





 Body Fluid Body Fluid Culture - Preliminary





    No Growth Day 1





 Skin and Soft Tissue MRSA/MSSA (PCR - Final





    Mrsa Negative





    S.aureus Negative


 


 07/17/19 10:11 Aerobic Blood Culture - Final





 Blood Venous    Strep Mitis/Strep Oralis





 Anaerobic Blood Culture - Final





    Streptococcus Mitis/Oralis


 


 07/17/19 14:14 Gram Stain - Final





 Body Fluid Skin and Soft Tissue MRSA/MSSA (PCR - Final





    Mrsa Negative





    S.aureus Negative











Right hip dressing changed


scant bloody drainage


incision benign


calf NT and soft


+DF right ankle


4x4 and tape applied.





Assessment:


[]s/p I&D right total hip infection, poly exchange 7/18/19








Plan:


[]BC clear day3, PIC line Monday


  WBAT RLE


  Ceftriaxone 8 wks from discharge- Poss home Monday

## 2019-07-20 NOTE — PN
Subjective


Date of Service: 19


Interval History: 





 Denies chest pain or shortness of breath.  denies fever or chills.  denies abd 

pain n/v/d. mild pain at the right hip surgical site rated at 2.  


Family History: Unchanged from Admission - non-contributory


Social History: Unchanged from Admission - remote history of tobacco, denies 

ETOH, no drugs, works full time, lives with wife


Past Medical History: Unchanged from Admission - HTN, HLP, OA





Objective


Active Medications: 








Acetaminophen (Tylenol Tab*)  975 mg PO Q8H PRN


   PRN Reason: FEVER/PAIN


   Last Admin: 19 10:28 Dose:  975 mg


Acetaminophen (Tylenol Tab*)  975 mg PO Q8H [facility]


   Last Admin: 19 12:44 Dose:  Not Given


Apixaban (Eliquis*)  2.5 mg PO BID [facility]


   Last Admin: 19 09:12 Dose:  2.5 mg


Atenolol (Tenormin Tab*)  25 mg PO DAILY [facility]


   Last Admin: 19 09:13 Dose:  25 mg


Bisacodyl (Dulcolax Supp*)  10 mg [place] DAILY PRN


   PRN Reason: constipation


Cyclobenzaprine HCl (Flexeril Tab*)  10 mg PO TID PRN


   PRN Reason: SPASMS


Diphenhydramine HCl (Benadryl Iv*)  25 mg IV Q6H PRN


   PRN Reason: itching


Diphenhydramine HCl (Benadryl Po*)  25 mg PO Q6H PRN


   PRN Reason: itching


Docusate Sodium (Colace Cap*)  100 mg PO BID [facility]


   Last Admin: 19 09:13 Dose:  100 mg


Ceftriaxone Sodium 2 gm/ (Sodium Chloride)  100 mls @ 200 mls/hr IVPB Q24H [facility]


   Last Admin: 19 13:57 Dose:  200 mls/hr


Lactated Ringer's (Lactated Ringers 1000 Ml Bag*)  1,000 mls @ 100 mls/hr IV 

PER RATE [facility]


   Last Admin: 19 00:00 Dose:  100 mls/hr


Lactulose (Lactulose*)  30 ml PO Q6H PRN


   PRN Reason: constipation


Magnesium Hydroxide (Milk Of Magnesia Liq*)  30 ml PO BID [facility]


   Last Admin: 19 09:11 Dose:  30 ml


Magnesium Hydroxide (Milk Of Magnesia Liq*)  30 ml PO Q6H PRN


   PRN Reason: constipation


Morphine Sulfate (Morphine Inj (Syringe))*)  2 mg IV Q2H PRN


   PRN Reason: PAIN - UNRELIEVED


Ondansetron HCl (Zofran Inj*)  4 mg IV Q6H PRN


   PRN Reason: nausea


Ondansetron HCl (Zofran Odt Tab*)  4 mg PO Q6H PRN


   PRN Reason: NAUSEA


Oxycodone HCl (Roxycodone Tab*)  10 mg PO Q4H PRN


   PRN Reason: PAIN - SEVERE


Oxycodone/Acetaminophen (Percocet 5/325 Tab*)  1 tab PO Q4H PRN


   PRN Reason: PAIN - MILD


   Last Admin: 19 09:12 Dose:  1 tab


Oxycodone/Acetaminophen (Percocet 5/325 Tab*)  2 tab PO Q4H PRN


   PRN Reason: PAIN - MODERATE


Paroxetine HCl (Paxil Tab*)  10 mg PO DAILY [facility]


   Last Admin: 19 09:12 Dose:  10 mg


Polyethylene Glycol/Electrolytes (Miralax*)  17 gm PO DAILY PRN


   PRN Reason: Constipation


Tramadol HCl (Ultram*)  50 mg PO Q6H PRN


   PRN Reason: PAIN


Trazodone HCl (Desyrel Tab*)  25 mg PO BEDTIME PRN


   PRN Reason: insomnia








 Vital Signs - 8 hr











  19





  07:26 08:00 09:12


 


Temperature 97.7 F  


 


Pulse Rate 67  


 


Respiratory 18 18 16





Rate   


 


Blood Pressure 120/71  





(mmHg)   


 


O2 Sat by Pulse 96  





Oximetry   














  19





  11:51 11:59


 


Temperature 98.2 F 


 


Pulse Rate 66 


 


Respiratory 18 16





Rate  


 


Blood Pressure 109/61 





(mmHg)  


 


O2 Sat by Pulse 96 





Oximetry  











Oxygen Devices in Use Now: None


Appearance: alert and oriented x3


Eyes: No Scleral Icterus


Ears/Nose/Mouth/Throat: Clear Oropharnyx, Mucous Membranes Moist


Neck: NL Appearance and Movements; NL JVP, Trachea Midline


Respiratory: Symmetrical Chest Expansion and Respiratory Effort, Clear to 

Auscultation


Cardiovascular: NL Sounds; No Murmurs; No JVD, No Edema


Abdominal: NL Sounds; No Tenderness; No Distention


Extremities: No Edema, No Clubbing, Cyanosis


Skin: No Rash or Ulcers, - - dressing dry and intact to right hip


Neurological: Alert and Oriented x 3


Nutrition: Taking PO's


Result Diagrams: 


 19 07:58





 19 11:10


Microbiology and Other Data: 


 Microbiology











 19 14:14 Gram Stain - Final





 Body Fluid Skin and Soft Tissue MRSA/MSSA (PCR - Final





    Mrsa Negative





    S.aureus Negative











Diagnostic Imaging: 





Patient Name:         ANGELITO DE LA O                                           

                        Medical Record#: [id number]


Ordering Physician: Leo Alonzo MD                                          

                        Acct.#: [id number]


:     1960         Age: 58   Sex: M                                   

                        Location: EMERGENCY DEPARTMENT


Exam Date: 19 1209                                                       

                        ADM Status: REG ER


Order Information:                         Aspiration/Injection Hip Right


Accession Number:                          [id number]


CPT:                                       [id number]


Indication: Right hip pain, fever and elevated white blood cell.





Approximately 27 seconds of fluoroscopy time was used.





Using usual aseptic technique and lidocaine as local anesthetic a 22-gauge 

needle was


placed within the right hip joint. Aspiration was attempted. 2 mL of saline was 

then


injected into the joint space. The needle was then manipulated. Approximately 

15 mL of


pink murky fluid was then aspirated. The specimen was sent for bacteriology.





IMPRESSION: Successful aspiration and localization of the right hip with 

aspiration of 15


mL of murky pink fluid from the right hip joint.





Patient Name:         ANGELITO DE LA O                                           

                        Medical Record#: [id number]


Ordering Physician: Elisa HEATH                                        

                        Acct.#: [id number]


:     1960         Age: 58   Sex: M                                   

                        Location: EMERGENCY DEPARTMENT


Exam Date: 19 0808                                                       

                        ADM Status: REG ER


Order Information:                         HIP RIGHT 2 VIEWS AND PELVIS


Accession Number:                          [id number]


CPT:                                       [id number]


Indication: RIGHT hip pain. Post hip replacement in .





Comparison: 2012





Technique: AP pelvis and AP and frog-leg lateral views RIGHT hip.





Report: Normally located RIGHT total hip replacement. Negative for 

periprosthetic fracture


or stigmata of loosening. Negative for pelvic fracture or joint diastases. 

Lumbar sacral


spine degenerative spondylosis and facet joint osteoarthritis. Unremarkable 

soft tissue


contours. 





IMPRESSION: 


#. Unremarkable radiographic follow-up of prosthetic RIGHT hip.




















Assess/Plan/Problems-Billing


Assessment and Recommendations: 





This is a 58 year old male that presents to the ER with septic right hip 

prosthesis and now found to have positive blood cultures.























- Patient Problems


(1) Bacteremia due to Streptococcus


Current Visit: Yes   Status: Acute   Code(s): R78.81 - BACTEREMIA; B95.5 - UNSP 

STREPTOCOCCUS [facility] THE [facility]   SNOMED Code(s): 

309788363752


   Comment: 


 - Strep mitis/strep oralis in joint and blood cultures


 - Lyme atb negative


 - ID following, continue ceftriaxone 2 gms IV


 - remains afebrile     





(2) HTN (hypertension)


Current Visit: Yes   Status: Acute   Code(s): I10 - ESSENTIAL (PRIMARY) 

HYPERTENSION   SNOMED Code(s): 82506729


   Comment: 


 - Stable on atenolol   





(3) Prosthetic hip infection


Current Visit: Yes   Status: Acute   Code(s): T84.59XA - INFECT/INFLM REACTION 

DUE TO OTH INTERNAL JOINT PROSTH, INIT; Z96.649 - PRESENCE OF UNSPECIFIED 

ARTIFICIAL HIP JOINT   SNOMED Code(s): 349336012


   Comment: 


 - Hip aspiration / yielded strep 


 - Plan for surgical washout completed POD #1


 - Pain control, supportive care


- will need picc line when blood cultures are cleared for Long term IV 

antibiotics


- blood cultures today - no growth x 1 day    





(4) DVT prophylaxis


Current Visit: Yes   Status: Acute   Code(s): Z29.9 - ENCOUNTER FOR 

PROPHYLACTIC MEASURES, UNSPECIFIED   SNOMED Code(s): 278627127


   Comment:  


Eliquis   





(5) Full code status


Current Visit: Yes   Status: Acute   Code(s): Z78.9 - OTHER SPECIFIED HEALTH 

STATUS   SNOMED Code(s): 191564024


   Comment: 


   


Status and Disposition: 





Inpatient, dispo per ortho

## 2019-07-21 LAB
HCT VFR BLD AUTO: 36 % (ref 42–52)
HGB BLD-MCNC: 12.6 G/DL (ref 14–18)
PLATELET # BLD AUTO: 268 10^3/UL (ref 150–450)

## 2019-07-21 RX ADMIN — MAGNESIUM HYDROXIDE SCH: 400 SUSPENSION ORAL at 08:55

## 2019-07-21 RX ADMIN — ACETAMINOPHEN SCH MG: 325 TABLET ORAL at 13:15

## 2019-07-21 RX ADMIN — ATENOLOL SCH MG: 25 TABLET ORAL at 08:54

## 2019-07-21 RX ADMIN — DOCUSATE SODIUM SCH MG: 100 CAPSULE, LIQUID FILLED ORAL at 20:55

## 2019-07-21 RX ADMIN — MAGNESIUM HYDROXIDE SCH: 400 SUSPENSION ORAL at 20:56

## 2019-07-21 RX ADMIN — CEFTRIAXONE SODIUM SCH MLS/HR: 2 INJECTION, POWDER, FOR SOLUTION INTRAVENOUS at 14:15

## 2019-07-21 RX ADMIN — PAROXETINE SCH MG: 10 TABLET, FILM COATED ORAL at 08:54

## 2019-07-21 RX ADMIN — ACETAMINOPHEN SCH: 325 TABLET ORAL at 05:55

## 2019-07-21 RX ADMIN — APIXABAN SCH MG: 2.5 TABLET, FILM COATED ORAL at 20:55

## 2019-07-21 RX ADMIN — DOCUSATE SODIUM SCH MG: 100 CAPSULE, LIQUID FILLED ORAL at 08:54

## 2019-07-21 RX ADMIN — ACETAMINOPHEN SCH MG: 325 TABLET ORAL at 20:55

## 2019-07-21 RX ADMIN — APIXABAN SCH MG: 2.5 TABLET, FILM COATED ORAL at 08:54

## 2019-07-21 NOTE — PN
Subjective


Date of Service: 19


Interval History: 





reports that slept well overnight.  Denies chest pain or shortness of breath.  

no fever or chills.  denies abd pain n/v/d. 


mild pain to left hip incision. 


Family History: Unchanged from Admission - non-contributory


Social History: Unchanged from Admission - remote history of tobacco, denies 

ETOH, no drugs, works full time, lives with wife


Past Medical History: Unchanged from Admission - HTN, HLP, OA





Objective


Active Medications: 








Acetaminophen (Tylenol Tab*)  975 mg PO Q8H PRN


   PRN Reason: FEVER/PAIN


   Last Admin: 19 10:28 Dose:  975 mg


Acetaminophen (Tylenol Tab*)  975 mg PO Q8H [facility]


   Last Admin: 19 05:55 Dose:  Not Given


Apixaban (Eliquis*)  2.5 mg PO BID [facility]


   Last Admin: 19 08:54 Dose:  2.5 mg


Atenolol (Tenormin Tab*)  25 mg PO DAILY [facility]


   Last Admin: 19 08:54 Dose:  25 mg


Bisacodyl (Dulcolax Supp*)  10 mg [place] DAILY PRN


   PRN Reason: constipation


Cyclobenzaprine HCl (Flexeril Tab*)  10 mg PO TID PRN


   PRN Reason: SPASMS


Diphenhydramine HCl (Benadryl Iv*)  25 mg IV Q6H PRN


   PRN Reason: itching


Diphenhydramine HCl (Benadryl Po*)  25 mg PO Q6H PRN


   PRN Reason: itching


Docusate Sodium (Colace Cap*)  100 mg PO BID [facility]


   Last Admin: 19 08:54 Dose:  100 mg


Ceftriaxone Sodium 2 gm/ (Sodium Chloride)  100 mls @ 200 mls/hr IVPB Q24H [facility]


   Last Admin: 19 13:57 Dose:  200 mls/hr


Lactated Ringer's (Lactated Ringers 1000 Ml Bag*)  1,000 mls @ 100 mls/hr IV 

PER RATE [facility]


   Last Admin: 19 00:00 Dose:  100 mls/hr


Lactulose (Lactulose*)  30 ml PO Q6H PRN


   PRN Reason: constipation


Magnesium Hydroxide (Milk Of Magnesia Liq*)  30 ml PO BID [facility]


   Last Admin: 19 08:55 Dose:  Not Given


Magnesium Hydroxide (Milk Of Magnesia Liq*)  30 ml PO Q6H PRN


   PRN Reason: constipation


Morphine Sulfate (Morphine Inj (Syringe))*)  2 mg IV Q2H PRN


   PRN Reason: PAIN - UNRELIEVED


Ondansetron HCl (Zofran Inj*)  4 mg IV Q6H PRN


   PRN Reason: nausea


Ondansetron HCl (Zofran Odt Tab*)  4 mg PO Q6H PRN


   PRN Reason: NAUSEA


Oxycodone HCl (Roxycodone Tab*)  10 mg PO Q4H PRN


   PRN Reason: PAIN - SEVERE


Oxycodone/Acetaminophen (Percocet 5/325 Tab*)  1 tab PO Q4H PRN


   PRN Reason: PAIN - MILD


   Last Admin: 19 09:12 Dose:  1 tab


Oxycodone/Acetaminophen (Percocet 5/325 Tab*)  2 tab PO Q4H PRN


   PRN Reason: PAIN - MODERATE


Paroxetine HCl (Paxil Tab*)  10 mg PO DAILY [facility]


   Last Admin: 19 08:54 Dose:  10 mg


Polyethylene Glycol/Electrolytes (Miralax*)  17 gm PO DAILY PRN


   PRN Reason: Constipation


Tramadol HCl (Ultram*)  50 mg PO Q6H PRN


   PRN Reason: PAIN


Trazodone HCl (Desyrel Tab*)  25 mg PO BEDTIME PRN


   PRN Reason: insomnia








 Vital Signs - 8 hr











  19





  07:16 08:00 11:41


 


Temperature 98.8 F  98.3 F


 


Pulse Rate 74  63


 


Respiratory 16 18 18





Rate   


 


Blood Pressure 130/80  126/74





(mmHg)   


 


O2 Sat by Pulse 96  95





Oximetry   











Oxygen Devices in Use Now: None


Appearance: alert , sitting in the chair.  no acute distress


Eyes: No Scleral Icterus


Ears/Nose/Mouth/Throat: NL Teeth, Lips, Gums, Mucous Membranes Moist


Neck: NL Appearance and Movements; NL JVP, Trachea Midline


Respiratory: Symmetrical Chest Expansion and Respiratory Effort, Clear to 

Auscultation


Cardiovascular: NL Sounds; No Murmurs; No JVD, No Edema


Abdominal: NL Sounds; No Tenderness; No Distention


Extremities: No Edema, No Clubbing, Cyanosis


Skin: No Rash or Ulcers, - - dressing intact and dry to left hip 


Neurological: Alert and Oriented x 3


Nutrition: Taking PO's


Result Diagrams: 


 19 08:00





 19 11:10


Microbiology and Other Data: 


 Microbiology











 19 14:14 Gram Stain - Final





 Body Fluid Skin and Soft Tissue MRSA/MSSA (PCR - Final





    Mrsa Negative





    S.aureus Negative











Diagnostic Imaging: 





Patient Name:         ANGELITO DE LA O                                           

                        Medical Record#: [id number]


Ordering Physician: Leo Alonzo MD                                          

                        Acct.#: [id number]


:     1960         Age: 58   Sex: M                                   

                        Location: EMERGENCY DEPARTMENT


Exam Date: 19 1209                                                       

                        ADM Status: REG ER


Order Information:                         Aspiration/Injection Hip Right


Accession Number:                          [id number]


CPT:                                       [id number]


Indication: Right hip pain, fever and elevated white blood cell.





Approximately 27 seconds of fluoroscopy time was used.





Using usual aseptic technique and lidocaine as local anesthetic a 22-gauge 

needle was


placed within the right hip joint. Aspiration was attempted. 2 mL of saline was 

then


injected into the joint space. The needle was then manipulated. Approximately 

15 mL of


pink murky fluid was then aspirated. The specimen was sent for bacteriology.





IMPRESSION: Successful aspiration and localization of the right hip with 

aspiration of 15


mL of murky pink fluid from the right hip joint.





Patient Name:         ANGELITO DE LA O                                           

                        Medical Record#: [id number]


Ordering Physician: Elisa HEATH                                        

                        Acct.#: [id number]


:     1960         Age: 58   Sex: M                                   

                        Location: EMERGENCY DEPARTMENT


Exam Date: 19 0808                                                       

                        ADM Status: REG ER


Order Information:                         HIP RIGHT 2 VIEWS AND PELVIS


Accession Number:                          [id number]


CPT:                                       [id number]


Indication: RIGHT hip pain. Post hip replacement in 2012.





Comparison: 2012





Technique: AP pelvis and AP and frog-leg lateral views RIGHT hip.





Report: Normally located RIGHT total hip replacement. Negative for 

periprosthetic fracture


or stigmata of loosening. Negative for pelvic fracture or joint diastases. 

Lumbar sacral


spine degenerative spondylosis and facet joint osteoarthritis. Unremarkable 

soft tissue


contours. 





IMPRESSION: 


#. Unremarkable radiographic follow-up of prosthetic RIGHT hip.




















Assess/Plan/Problems-Billing


Assessment and Recommendations: 





This is a 58 year old male that presents to the ER with septic right hip 

prosthesis and now found to have positive blood cultures.























- Patient Problems


(1) Bacteremia due to Streptococcus


Current Visit: Yes   Status: Acute   Code(s): R78.81 - BACTEREMIA; B95.5 - UNSP 

STREPTOCOCCUS AS THE CAUSE OF DISEASES CLASSD [facility]   SNOMED Code(s): 

160355441565


   Comment: 


 - Strep mitis/strep oralis in joint and blood cultures


- repeat blood cultures show no growth for 2 days 


 - Lyme atb negative


 - ID following, continue ceftriaxone 2 gms IV


 - remains afebrile     





(2) HTN (hypertension)


Current Visit: Yes   Status: Acute   Code(s): I10 - ESSENTIAL (PRIMARY) 

HYPERTENSION   SNOMED Code(s): 19785339


   Comment: 


 - Stable on atenolol   





(3) Prosthetic hip infection


Current Visit: Yes   Status: Acute   Code(s): T84.59XA - INFECT/INFLM REACTION 

DUE TO OTH INTERNAL JOINT PROSTH, INIT; Z96.649 - PRESENCE OF UNSPECIFIED 

ARTIFICIAL HIP JOINT   SNOMED Code(s): 977616678


   Comment: 


 - Hip aspiration 19 yielded strep 


 - Plan for surgical washout completed 


 - Pain control, supportive care


- will need picc line when blood cultures are cleared for Long term IV 

antibiotics


- blood cultures today - no growth x 1 day    





(4) DVT prophylaxis


Current Visit: Yes   Status: Acute   Code(s): Z29.9 - ENCOUNTER FOR 

PROPHYLACTIC MEASURES, UNSPECIFIED   SNOMED Code(s): 791856268


   Comment:  


Eliquis   





(5) Full code status


Current Visit: Yes   Status: Acute   Code(s): Z78.9 - OTHER SPECIFIED HEALTH 

STATUS   SNOMED Code(s): 878373301


   Comment: 


   


Status and Disposition: 





Inpatient, dispo per ortho

## 2019-07-21 NOTE — PN
Progress Note





- Progress Note


Date of Service: 07/21/19


SOAP: 


Subjective:


[]Patient seen bedside, continues to do well.  Denies fever, chills, CP, SOB, 

nausea or vomiting.  Hoping to go home tomorrow after PIC line placement. 








Objective:


[]


 Vital Signs











Temp  98.8 F   07/21/19 07:16


 


Pulse  74   07/21/19 07:16


 


Resp  18   07/21/19 08:00


 


BP  130/80   07/21/19 07:16


 


Pulse Ox  96   07/21/19 07:16








 Intake & Output











 07/20/19 07/21/19 07/21/19





 18:59 06:59 18:59


 


Intake Total 1635 770 720


 


Output Total  2000 


 


Balance 1635 -1230 720


 


Intake:   


 


  IV Fluids 30  


 


    NS 30  


 


  IVPB 105  


 


    ABX - CEFTRIAXONE 105  


 


  Oral 1500 770 720


 


Output:   


 


  Urine  2000 


 


Other:   


 


  Estimated Void Medium Medium 


 


  # Voids 3 1 








 Laboratory Results - last 24 hr











  07/17/19 07/20/19 07/21/19





  14:14 11:10 08:00


 


Hgb    12.6 L


 


Hct    36 L


 


Plt Count    268


 


MPV    8.3


 


Potassium   4.3 


 


B. burgdorferi (PCR)  Negative  


 


B. mayonii (PCR)  Negative  


 


B.garinii/B.afzelii PCR  Negative  


 


Lyme Specimen Source  Synovial fluid  








Right hip dressing dry and intact


calf NT and soft


+DF right ankle


sensation intact distally RLe








Assessment:


[]s/p I&D poly exchange infected right total hip POD #3








Plan:


[]BC clear day3, PIC line Monday


  eliquis BID- 30 days post op DVT prophy


  WBAT RLE


  Ceftriaxone 8 wks from discharge- Plan for home Monday

## 2019-07-22 VITALS — SYSTOLIC BLOOD PRESSURE: 126 MMHG | DIASTOLIC BLOOD PRESSURE: 73 MMHG

## 2019-07-22 LAB
HCT VFR BLD AUTO: 38 % (ref 42–52)
HGB BLD-MCNC: 13.1 G/DL (ref 14–18)
PLATELET # BLD AUTO: 326 10^3/UL (ref 150–450)

## 2019-07-22 PROCEDURE — 02HV33Z INSERTION OF INFUSION DEVICE INTO SUPERIOR VENA CAVA, PERCUTANEOUS APPROACH: ICD-10-PCS | Performed by: ORTHOPAEDIC SURGERY

## 2019-07-22 RX ADMIN — ACETAMINOPHEN SCH: 325 TABLET ORAL at 13:54

## 2019-07-22 RX ADMIN — ATENOLOL SCH MG: 25 TABLET ORAL at 08:34

## 2019-07-22 RX ADMIN — ACETAMINOPHEN SCH: 325 TABLET ORAL at 05:09

## 2019-07-22 RX ADMIN — PAROXETINE SCH MG: 10 TABLET, FILM COATED ORAL at 08:35

## 2019-07-22 RX ADMIN — APIXABAN SCH MG: 2.5 TABLET, FILM COATED ORAL at 08:34

## 2019-07-22 RX ADMIN — MAGNESIUM HYDROXIDE SCH: 400 SUSPENSION ORAL at 08:38

## 2019-07-22 RX ADMIN — DOCUSATE SODIUM SCH MG: 100 CAPSULE, LIQUID FILLED ORAL at 08:34

## 2019-07-22 RX ADMIN — CEFTRIAXONE SODIUM SCH MLS/HR: 2 INJECTION, POWDER, FOR SOLUTION INTRAVENOUS at 13:48

## 2019-07-22 NOTE — PN
Progress Note





- Progress Note


Date of Service: 07/22/19


SOAP: 


Subjective:


Pt seen sitting in chair. No complaint of pain. Denies CP, SOB, F/C.





Vital Signs:











Temp Pulse Resp BP Pulse Ox


 


 98.6 F   62   22   122/72   93 


 


 07/22/19 11:16  07/22/19 11:16  07/22/19 11:16  07/22/19 11:16  07/22/19 11:16








 Laboratory Last Values











WBC  8.0 10^3/uL (3.5-10.8)   07/20/19  07:58    


 


RBC  4.08 10^6 /uL (4.18-5.48)  L  07/20/19  07:58    


 


Hgb  13.1 g/dL (14.0-18.0)  L  07/22/19  07:40    


 


Hct  38 % (42-52)  L  07/22/19  07:40    


 


MCV  89 fL (80-94)   07/20/19  07:58    


 


MCH  31 pg (27-31)   07/20/19  07:58    


 


MCHC  35 g/dL (31-36)   07/20/19  07:58    


 


RDW  14 % (10-15)   07/20/19  07:58    


 


Plt Count  326 10^3/uL (150-450)   07/22/19  07:40    


 


MPV  8.0 fL (7.4-10.4)   07/22/19  07:40    


 


Neut % (Auto)  68.4 %  07/20/19  07:58    


 


Lymph % (Auto)  17.6 %  07/20/19  07:58    


 


Mono % (Auto)  11.9 %  07/20/19  07:58    


 


Eos % (Auto)  1.6 %  07/20/19  07:58    


 


Baso % (Auto)  0.5 %  07/20/19  07:58    


 


Absolute Neuts (auto)  5.5 10^3/ul (1.5-7.7)   07/20/19  07:58    


 


Absolute Lymphs (auto)  1.4 10^3/ul (1.0-4.8)   07/20/19  07:58    


 


Absolute Monos (auto)  1.0 10^3/ul (0-0.8)  H  07/20/19  07:58    


 


Absolute Eos (auto)  0.1 10^3/ul (0-0.6)   07/20/19  07:58    


 


Absolute Basos (auto)  0.0 10^3/ul (0-0.2)   07/20/19  07:58    


 


Absolute Nucleated RBC  0.0 10^3/ul  07/20/19  07:58    


 


Nucleated RBC %  0.1   07/20/19  07:58    


 


ESR  7 mm/Hr (0-19)   07/17/19  10:05    


 


INR (Anticoag Therapy)  1.02  (0.82-1.09)   07/18/19  06:28    


 


Sodium  136 mmol/L (135-145)   07/20/19  07:58    


 


Potassium  4.3 mmol/L (3.5-5.0)   07/20/19  11:10    


 


Chloride  108 mmol/L (101-111)   07/20/19  07:58    


 


Carbon Dioxide  21 mmol/L (22-32)  L  07/20/19  07:58    


 


Anion Gap  7 mmol/L (2-11)   07/20/19  07:58    


 


BUN  12 mg/dL (6-24)   07/20/19  07:58    


 


Creatinine  0.71 mg/dL (0.67-1.17)   07/20/19  07:58    


 


Est GFR ( Amer)  138.9  (>60)   07/20/19  07:58    


 


Est GFR (Non-Af Amer)  114.8  (>60)   07/20/19  07:58    


 


BUN/Creatinine Ratio  16.9  (8-20)   07/20/19  07:58    


 


Glucose  104 mg/dL ()  H  07/20/19  07:58    


 


Calcium  7.7 mg/dL (8.6-10.3)  L  07/20/19  07:58    


 


Total Bilirubin  0.80 mg/dL (0.2-1.0)   07/17/19  10:05    


 


AST  15 U/L (13-39)   07/17/19  10:05    


 


ALT  23 U/L (7-52)   07/17/19  10:05    


 


Alkaline Phosphatase  62 U/L ()   07/17/19  10:05    


 


C-Reactive Protein  150.34 mg/L (<8.01)  H  07/17/19  10:05    


 


Total Protein  6.7 g/dL (6.4-8.9)   07/17/19  10:05    


 


Albumin  4.1 g/dL (3.2-5.2)   07/17/19  10:05    


 


Globulin  2.6 g/dL (2-4)   07/17/19  10:05    


 


Albumin/Globulin Ratio  1.6  (1-3)   07/17/19  10:05    


 


Fluid Source  Synovial fluid   07/17/19  14:14    


 


Fluid Volume  10 mL  07/17/19  14:14    


 


Fluid Color  Marely   07/17/19  14:14    


 


Fluid Appearance  Cloudy   07/17/19  14:14    


 


Fluid WBC  82908 /mcL (0-643033)  07/17/19  14:14    


 


Fluid RBC  32334 /mcL  07/17/19  14:14    


 


Fluid Tot Cell Count  100   07/17/19  14:14    


 


Fluid Neutrophils  98 %  07/17/19  14:14    


 


Fluid Lymphocytes  2 %  07/17/19  14:14    


 


Fluid Cell Count Rvw By     07/17/19  14:14    


 


B. burgdorferi (PCR)  Negative  (Negative)   07/17/19  14:14    


 


B. mayonii (PCR)  Negative  (Negative)   07/17/19  14:14    


 


B.garinii/B.afzelii PCR  Negative  (Negative)   07/17/19  14:14    


 


Fluid Comment     07/17/19  14:14    


 


Vancomycin Trough  13.0 mcg/mL  07/18/19  14:47    


 


Lyme Specimen Source  Synovial fluid   07/17/19  14:14    


 


Lyme Total Antibody  Negative  (Negative)   07/17/19  10:11    


 


Blood Type  A Negative   07/18/19  06:28    


 


Antibody Screen  Negative   07/18/19  06:28    











Objective:


A&O x3, NAD, Dressing C/D/I, Calves soft and nontender, NVI distally.








Assessment:


s/p I&D poly exchange infected right total hip replacement POD #4








Plan:


PT/OT OOB


WBAT RLE


Pain control


DVT prophylaxis - Eliquis


PICC line placement prior to d/c


contiue IV ceftriaxone


D/C Home today

## 2019-07-22 NOTE — DS
Orthopedic Discharge Summary





- Discharge Summary





Date of Admission:19


Date of Discharge: 19


Date of Surgery: 19


Attending Orthopedic Provider: Dea Mai


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


Operative Procedure: I&D infected right total hip replacement


Disposition of Patient: Discharge to home with home health care


Condition of Patient: Stable


History: ANGELITO DE LA O is a 58 year old M with one week of increasingly severe 

right hip pain. He is s/p right hip replacement in  by Dr. Nguyễn. Patient 

was brought to the ED at [facility] and was found to have an infected right hip. 

Patient has failed conservative management and has elected to undergo an 

irrigation and debridement and polyethylene exchange right total hip 

replacement.


Hospital Course: ANGELITO was admitted to [facility] on 19. 

Patient underwent a irrigation and debridement and polyethylene exchange right 

total hip replacement without complication followed by a brief recovery in PACU 

and transfer to the Short Stay Surgical Unit in stable condition. Our 

hospitalist service, Infectious disease service, physical therapy and 

occupational therapy also participated in this patients care. Post-op day 1: 

patient was alert and in no acute distress. Dressing was clean, dry and intact. 

Operative extremity dorsiflexion and plantarflexion intact, sensation intact to 

light touch distally, DP2+. Post-op day two: dressing was changed, incision was 

clean, dry and intact. Patient was deemed to be medically and orthopedically 

stable for discharge on POD #4. Physical therapy goals were met. PICC line was 

placed for continued IV antibiotics. Will be discharged to home in a stable 

condition.





 Home Medications











 Medication  Instructions  Recorded  Confirmed  Type


 


Atenolol TAB* [Tenormin TAB* 25 MG] 25 mg PO DAILY 19 History


 


PARoxetine HCL TAB* [Paxil TAB*] 10 mg PO DAILY 19 History


 


Apixaban* [Eliquis*] 2.5 mg PO BID  tab 19  Rx


 


oxyCODONE/Acetamin 5/325 MG* 1 tab PO Q4H PRN  tab 19  Rx





[Percocet 5/325 TAB*]    


 


oxyCODONE/Acetamin 5/325 MG* 2 tab PO Q4H PRN  tab 19  Rx





[Percocet 5/325 TAB*]    








Discharge Instructions following Orthopedic Surgery:





Activity:  


* Weight Bearing as tolerated


* Continue physical therapy and occupational therapy exercises as shown





**Hip replacements: Continue Hip Precautions- do not cross legs or bend greater 

than 90 degrees/squat**





Wound care: 


* OK to shower on post-op day 3, no bathing, swimming, or submerging wound. 


* Use gentle soap, pat dry. Cover with gauze, ACE wrap or tape.


* Visiting home nurse to do wound checks.





Call Orthopedic office for: 


* Increased drainage


* Redness


* Increased pain


* Fever 





***Go to ER with shortness of breath or chest pain.***





Diet: 


* Regular diet


* Increase fluids and fiber to prevent constipation. 


* Continue to use stool softeners, call office if no bowel motion within 48 

hours.








Medications


See Home Medication List in your packet for medications that you should take 

after discharge.





DVT Prophylaxis:





   Eliquis Dosin.5 mg, 1 tab every 12 hours x 30 days





Pain Control:





   Percocet Dosin/325 mg 1-2 tabs by mouth every 4-6 hours as needed for 

pain. Maximum of 6 tabs per day.





**Please note that Percocet contains Tylenol (acetaminophen). Maximum daily 

dose of Tylenol is 4000 mg from all sources.**





Antibiotics: Ceftriaxone 2gm IV every 24hrs





Antibiotics are required prior to any dental work.








FOLLOW UP: Follow up with Dr. Mai Within 10-14 days, call for appointment





Please call our office with any questions or concerns ([phone])

## 2019-07-22 NOTE — PN
Progress Note





- Progress Note


Date of Service: 07/22/19


SOAP: 


Subjective:


CC: Right prosthetic hip infection





HPI:


Mr. Murray is a 59 yo male with PMH significant for HTN, HLD, and a right total 

hip arthroplasty in 2012, he presented to the hospital with significant pain in 

the right hip with fevers and chills. Denies fever, chills, ABD pain, nausea, 

vomiting, diarrhea. Right hip pain is controlled. He reports feeling much 

better today than last week. Appetite is good. He is waiting for a PICC line 

today. 








Objective:


 Vital Signs - 8 hr











  07/22/19 07/22/19 07/22/19





  04:13 07:15 07:34


 


Temperature 97.9 F 98.6 F 


 


Pulse Rate 67 75 


 


Respiratory 18 20 18





Rate   


 


Blood Pressure 132/78 132/79 





(mmHg)   


 


O2 Sat by Pulse 94 94 





Oximetry   














  07/22/19





  11:16


 


Temperature 98.6 F


 


Pulse Rate 62


 


Respiratory 22





Rate 


 


Blood Pressure 122/72





(mmHg) 


 


O2 Sat by Pulse 93





Oximetry 











Physical Exam:


General: NAD, sitting up in a chair


Neurological: Alert and Oriented x4


HEENT: Moist MM, no thrush


Cardiovascular: Heart rate regular


Respiratory: Lung sounds clear bilateral


Abdominal: Bowel sounds present; ABD soft, non tender and non distended


MSK: SERRATO, no joint swelling


Skin: No rash





 Laboratory Results - last 24 hr











  07/22/19





  07:40


 


Hgb  13.1 L


 


Hct  38 L


 


Plt Count  326


 


MPV  8.0








 Microbiology











 07/19/19 11:53 Aerobic Blood Culture - Preliminary





 Blood Venous    No Growth Day 3





 Anaerobic Blood Culture - Preliminary





    No Growth Day 3


 


 07/19/19 11:45 Aerobic Blood Culture - Preliminary





 Blood Venous    No Growth Day 3





 Anaerobic Blood Culture - Preliminary





    No Growth Day 3


 


 07/17/19 10:05 Aerobic Blood Culture - Final





 Blood Venous    Strep Mitis/Strep Oralis





 Anaerobic Blood Culture - Final





    No Growth Day 5


 


 07/18/19 18:30 Skin and Soft Tissue MRSA/MSSA (PCR - Final





 Hip Right    Mrsa Negative





    S.aureus Negative





 Gram Stain - Final





 Wound Culture - Final





    Strep Mitis/Strep Oralis


 


 07/18/19 18:30 Anaerobic Culture - Final





 Wound    No Growth Day 4


 


 07/18/19 18:30 Gram Stain - Final





 Body Fluid Body Fluid Culture - Final





    No Growth Day 4





 Skin and Soft Tissue MRSA/MSSA (PCR - Final





    Mrsa Negative





    S.aureus Negative


 


 07/17/19 14:14 Gram Stain - Final





 Body Fluid Body Fluid Culture - Final





    Strep Mitis/Strep Oralis





 Skin and Soft Tissue MRSA/MSSA (PCR - Final





    Mrsa Negative





    S.aureus Negative


 


 07/17/19 10:11 Aerobic Blood Culture - Final





 Blood Venous    Strep Mitis/Strep Oralis





 Anaerobic Blood Culture - Final





    Streptococcus Mitis/Oralis








Assessment:


1. Right prosthetic hip infection. Synovial fluid from the knee with WBCs 29,

423. S/P I+D with polyethylene liner and femoral head exchange by Dr. Mai, 

POD #5. Cultures obtained from the hip with Strep Viridans. Afebrile and 

leukocytosis has resolved. Pain in the right hip continues to improve. 


2. Strep viridans bacteremia. Patient with a broken tooth, suspect this may be 

the cause of bacteremia. TTE without vegetation. Same bacteria also noted to be 

in the hip cultures. Repeat blood cultures from 7/19/19 with no growth on day 3.


3. Anemia. Suspect secondary to acute postop blood loss. HH is stable.





Plan:


Continue Ceftriaxone 2gm IV, day 5/56. Place PICC line. Weekly labs while on IV 

ABX: CBC, CMP, and CRP. Followup with ID in 2 weeks after discharge.